# Patient Record
Sex: FEMALE | Race: BLACK OR AFRICAN AMERICAN | Employment: FULL TIME | URBAN - METROPOLITAN AREA
[De-identification: names, ages, dates, MRNs, and addresses within clinical notes are randomized per-mention and may not be internally consistent; named-entity substitution may affect disease eponyms.]

---

## 2017-01-11 ENCOUNTER — HOSPITAL ENCOUNTER (OUTPATIENT)
Dept: DIABETES SERVICES | Age: 67
Discharge: HOME | End: 2017-01-11
Payer: COMMERCIAL

## 2017-01-11 PROCEDURE — G0109 DIAB MANAGE TRN IND/GROUP: HCPCS

## 2018-03-23 LAB
LDL-C, EXTERNAL: 106.8
MICROALBUMIN UR TEST STR-MCNC: <0.7 MG/DL

## 2018-08-13 LAB
CREATININE, EXTERNAL: 0.9
HBA1C MFR BLD HPLC: 15.9 %

## 2018-09-20 ENCOUNTER — OFFICE VISIT (OUTPATIENT)
Dept: ENDOCRINOLOGY | Age: 68
End: 2018-09-20

## 2018-09-20 VITALS
DIASTOLIC BLOOD PRESSURE: 74 MMHG | OXYGEN SATURATION: 99 % | HEART RATE: 70 BPM | WEIGHT: 143.2 LBS | HEIGHT: 66 IN | SYSTOLIC BLOOD PRESSURE: 136 MMHG | BODY MASS INDEX: 23.01 KG/M2

## 2018-09-20 DIAGNOSIS — E11.39 TYPE 2 DIABETES MELLITUS WITH OTHER OPHTHALMIC COMPLICATION, WITH LONG-TERM CURRENT USE OF INSULIN (HCC): Primary | ICD-10-CM

## 2018-09-20 DIAGNOSIS — Z79.4 TYPE 2 DIABETES MELLITUS WITH OTHER OPHTHALMIC COMPLICATION, WITH LONG-TERM CURRENT USE OF INSULIN (HCC): Primary | ICD-10-CM

## 2018-09-20 RX ORDER — INSULIN GLARGINE 100 [IU]/ML
50 INJECTION, SOLUTION SUBCUTANEOUS DAILY
Qty: 15 ML | Refills: 11 | Status: SHIPPED | OUTPATIENT
Start: 2018-09-20 | End: 2019-05-01

## 2018-09-20 RX ORDER — SIMVASTATIN 40 MG/1
TABLET, FILM COATED ORAL
COMMUNITY
End: 2019-05-20

## 2018-09-20 RX ORDER — LEVOTHYROXINE SODIUM 100 UG/1
TABLET ORAL
COMMUNITY
End: 2018-09-20

## 2018-09-20 NOTE — PROGRESS NOTES
Chief Complaint   Patient presents with   [facility]     new patient    Diabetes         1. Have you been to the ER, urgent care clinic since your last visit? Hospitalized since your last visit? no    2. Have you seen or consulted any other health care providers outside of the [address] [place] [address] since your last visit? Include any pap smears or colon screening.   No    Patient is taking synjardy xr 25/1000 1 tab daily

## 2018-09-20 NOTE — PATIENT INSTRUCTIONS
Diabetes. Avoid added sugars. Watch carbohydrate intake with meals and aim to keep this less than 45 grams per meal.    A Mediterranean style diet with 55% or less of calories from carbohydrates has been shown to be very helpful for people with diabetes. This diet consists of vegetables, whole fruit, nuts, whole grains, beans, lentils, olive oil, fish, chicken, and less refined grains, red and processed meats. Exercise: work up to 30 minutes 5 days weekly of walking, or any other activity that gets your heart rate up. Make sure you are getting enough sleep - at least 7 hours/night. Medications:  Continue pioglitazone  Continue Synjardy XR    Recommend adding Lantus 40 units once daily - following trend from bedtime to fasting would help us adjust this  Recommend adding Trulicity 4.91 mg weekly to help with mealtime needs and decrease insulin needs    Goals for blood glucose:  Fasting  (less than 150)  Lunch, Dinner, Bedtime -  (less than 180). In the meantime with the Humalog 75/25 I recommend adjusting based on your sugar prior to a meal and how much starch you plan on eating with the meal  You may need only 20 units twice daily if your diet is good and glucose are lower.

## 2018-09-20 NOTE — PROGRESS NOTES
Chief Complaint   Patient presents with    hospitals Care     new patient    Diabetes     History of Present Illness: Benigno Price is a 74 y.o. female presents for evaluation of diabetes. she has had diabetes since 1999. Most recent A1c was 15 in 8/2018. She was referred by Dr. Sushma Maya. Diabetes related complications:  Does have some 'bleeding' in right eye. Needs cataract removed  She does not have any symptoms of neuropathy  Micrograms has been negative    Current diabetes regimen:  Humalog 75/25 - 40 units twice daily  Pioglitazone 15 mg daily  Synjdary XR 25/1000 daily    Glucoses:  Does not like taking fingersticks. She reports being prescribed the freestyle nicole. She hopes to go to the pharmacy and pick this up. 325 this AM.   She did have a glucose of 60 at work last week. Diet:  Last night - bag of chips and Coke Zero. This AM: toast, corn beef hash and scrambled eggs  Cup of apple sauce    Beverages - was having soda - 2 sodas (24 ounce each). Cut this out in the last week or so. BMI 23: She reports her weight at age 25 was around 125 pounds. Her highest weight was around 175-180 pounds. Vit D def - taking 5,000 units daily  Cholesterol: She is taking simvastatin 40 mg  Hypertension taking lisinopril and atenolol. Social:  Works at [employer]. does night shifts - been doing nights for about 5 months. 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  empagliflozin-metformin (SYNJARDY XR) 25-1,000 mg TBph Take  by mouth.  simvastatin (ZOCOR) 40 mg tablet Take  by mouth nightly.  pioglitazone (ACTOS) 15 mg tablet Take 15 mg by mouth daily.  aspirin delayed-release 81 mg tablet Take 81 mg by mouth daily.  atenolol (TENORMIN) 25 mg tablet Take 25 mg by mouth daily.  lisinopril (PRINIVIL, ZESTRIL) 20 mg tablet Take 20 mg by mouth daily.  insulin lispro protamine/insulin lispro (HUMALOG MIX 75-25) 100 unit/mL (75-25) injection 15 Units by SubCUTAneous route two (2) times a day. No current facility-administered medications for this visit. No Known Allergies  Family History   Problem Relation Age of Onset    Diabetes Mother     Heart Failure Mother     Cancer Father      lung cancer-2nd to smoking     Social History     Social History    Marital status: SINGLE     Spouse name: N/A    Number of children: N/A    Years of education: N/A     Occupational History    Not on file. Social History Main Topics    Smoking status: Never Smoker    Smokeless tobacco: Never Used    Alcohol use No    Drug use: No    Sexual activity: Not on file     Other Topics Concern    Not on file     Social History Narrative       Physical Examination:  Visit Vitals    /74    Pulse 70    Ht 5' 6\" (1.676 m)    Wt 143 lb 3.2 oz (65 kg)    SpO2 99%    BMI 23.11 kg/m2   -   - General: pleasant, no distress,   - HEENT:No scleral/conjunctival injection, EOMI,MMM  - Cardiovascular: regular, normal rate  - Respiratory: normal effort  - Integumentary: no edema  - Neurological: alert and oriented  - Psychiatric: normal mood and affect    Data Reviewed:   Kidney function normal  Microalbumin negative  Hemoglobin A1c 15    Assessment/Plan:   1.  Type 2 diabetes mellitus with other ophthalmic complication, with long-term current use of insulin (HCC)   Uncontrolled  Reviewed pathology of type II diabetes, including insulin resistance and progressive loss of beta cell function and insulin production with time. Explained the role of weight loss and limiting carbohydrate intake in affecting insulin needs. Reviewed basal and prandial insulin needs. Reviewed handout and gave basic directions on carbohydrate content of foods. Recommended limiting carbohydrate intake to < 45 g with meals. Reviewed how Humalog 75/25 provides insulin and how this somewhat approximates her insulin needs. Was clear to note that she could have hypoglycemia if she ate a small carbohydrate meal and glucoses were relatively well controlled. She has made dietary improvements recently. Encouraged her to continue efforts to avoid liquid calories and liquid sugars. Encouraged efforts to limit starch intake as well. Reviewed that these improvements will help decrease insulin needs  Explained how Josh Casanova works. Encouraged her to continue taking this  Continue pioglitazone  If she is willing to make substantial dietary improvements, I think a regimen of Lantus 40 units daily and Trulicity may work well for her. Reports having commercial insurance, so I hope this will be affordable for her. Reviewed how important glucose information is to help determine further adjustments to her insulin regimen. In the meantime, if she desires to finish up her Humalog 75/25, recommend she  glucose level, planned food intake, activity level, when determining how much of a dose to take. She may only need 20 units if glucose is reasonably well-controlled and her carbohydrate intake is modest.  Reviewed how Humalog 75/25 p.o. daily be taken roughly every 12 hours. Reviewed the importance of sleep with her. Encouraged her to transition from a nighttime schedule to a daytime work schedule. Having a normal sleep-wake cycle will be of significant benefit for her diabetes control as well as her long-term health. We will have her return in 2 weeks to reassess.     Patient Instructions Diabetes. Avoid added sugars. Watch carbohydrate intake with meals and aim to keep this less than 45 grams per meal.    A Mediterranean style diet with 55% or less of calories from carbohydrates has been shown to be very helpful for people with diabetes. This diet consists of vegetables, whole fruit, nuts, whole grains, beans, lentils, olive oil, fish, chicken, and less refined grains, red and processed meats. Exercise: work up to 30 minutes 5 days weekly of walking, or any other activity that gets your heart rate up. Make sure you are getting enough sleep - at least 7 hours/night. Medications:  Continue pioglitazone  Continue Synjardy XR    Recommend adding Lantus 40 units once daily - following trend from bedtime to fasting would help us adjust this  Recommend adding Trulicity 4.28 mg weekly to help with mealtime needs and decrease insulin needs    Goals for blood glucose:  Fasting  (less than 150)  Lunch, Dinner, Bedtime -  (less than 180). In the meantime with the Humalog 75/25 I recommend adjusting based on your sugar prior to a meal and how much starch you plan on eating with the meal  You may need only 20 units twice daily if your diet is good and glucose are lower. Follow-up Disposition:  Return in about 2 weeks (around 10/4/2018) for Tuesday 10/2/2018 at 4:10.

## 2018-09-20 NOTE — MR AVS SNAPSHOT
[address] [facility] [address] [place] [address] 
[phone] Patient: Melanie Dougherty MRN: [id number] :1950 Visit Information Date & Time Provider Department Dept. Phone Encounter #  
 2018  2:30 PM Cesar Lau, [address] [facility] Diabetes and Endocrinology-[facility] [phone] Follow-up Instructions Return in about 2 weeks (around 10/4/2018) for Tuesday 10/2/2018 at 4:10. Upcoming Health Maintenance Date Due Hepatitis C Screening 1950 DTaP/Tdap/Td series (1 - Tdap) 10/15/1973 FOBT Q 1 YEAR AGE 50-75 10/15/2002 ZOSTER VACCINE AGE 60> 8/15/2012 GLAUCOMA SCREENING Q2Y 10/15/2017 Bone Densitometry (Dexa) Screening 10/15/2017 Pneumococcal 65+ Low/Medium Risk (1 of 2 - PCV13) 10/15/2017 BREAST CANCER SCRN MAMMOGRAM 2018 Influenza Age 5 to Adult 2018 Allergies as of 2018  Review Complete On: 2018 By: Cesar Lau MD  
 No Known Allergies Current Immunizations  Never Reviewed Name Date Influenza Vaccine 10/1/2016 Not reviewed this visit You Were Diagnosed With   
  
 Codes Comments Type 2 diabetes mellitus with other ophthalmic complication, with long-term current use of insulin (HCC)    -  Primary ICD-10-CM: E11.39, Z79.4 ICD-9-CM: 250.50, V58.67 Vitals BP Pulse Height(growth percentile) Weight(growth percentile) SpO2 BMI  
 136/74 70 5' 6\" (1.676 m) 143 lb 3.2 oz (65 kg) 99% 23.11 kg/m2 OB Status Smoking Status Menopause Never Smoker Vitals History BMI and BSA Data Body Mass Index Body Surface Area  
 23.11 kg/m 2 1.74 m 2 Your Updated Medication List  
  
   
This list is accurate as of 18  4:15 PM.  Always use your most recent med list.  
  
  
  
  
 ACTOS 15 mg tablet Generic drug:  pioglitazone Take 15 mg by mouth daily. aspirin delayed-release 81 mg tablet Take 81 mg by mouth daily. atenolol 25 mg tablet Commonly known as:  TENORMIN Take 25 mg by mouth daily. dulaglutide 0.75 mg/0.5 mL sub-q pen Commonly known as:  TRULICITY  
0.5 mL by SubCUTAneous route every seven (7) days. insulin glargine 100 unit/mL (3 mL) Inpn Commonly known as:  LANTUS,BASAGLAR  
50 Units by SubCUTAneous route daily. lisinopril 20 mg tablet Commonly known as:  Jodie Needy Take 20 mg by mouth daily. simvastatin 40 mg tablet Commonly known as:  ZOCOR Take  by mouth nightly. SYNJARDY XR 25-1,000 mg Tbph  
Generic drug:  empagliflozin-metformin Take  by mouth. Prescriptions Printed Refills  
 insulin glargine (LANTUS,BASAGLAR) 100 unit/mL (3 mL) inpn 11 Si Units by SubCUTAneous route daily. Class: Print Route: SubCUTAneous  
 dulaglutide (TRULICITY) 0.41 JG/7.3 mL sub-q pen 11 Si.5 mL by SubCUTAneous route every seven (7) days. Class: Print Route: SubCUTAneous Follow-up Instructions Return in about 2 weeks (around 10/4/2018) for Tuesday 10/2/2018 at 4:10. Patient Instructions Diabetes. Avoid added sugars. Watch carbohydrate intake with meals and aim to keep this less than 45 grams per meal. 
 
A Mediterranean style diet with 55% or less of calories from carbohydrates has been shown to be very helpful for people with diabetes. This diet consists of vegetables, whole fruit, nuts, whole grains, beans, lentils, olive oil, fish, chicken, and less refined grains, red and processed meats. Exercise: work up to 30 minutes 5 days weekly of walking, or any other activity that gets your heart rate up. Make sure you are getting enough sleep - at least 7 hours/night. Medications: 
Continue pioglitazone Continue Synjardy XR Recommend adding Lantus 40 units once daily - following trend from bedtime to fasting would help us adjust this Recommend adding Trulicity 6.62 mg weekly to help with mealtime needs and decrease insulin needs Goals for blood glucose: 
Fasting  (less than 150) Lunch, Dinner, Bedtime -  (less than 180). In the meantime with the Humalog 75/25 I recommend adjusting based on your sugar prior to a meal and how much starch you plan on eating with the meal 
You may need only 20 units twice daily if your diet is good and glucose are lower. Introducing [facility]! [facility] introduces [facility] patient portal. Now you can access parts of your medical record, email your doctor's office, and request medication refills online. 1. In your internet browser, go to [email or web address] 2. Click on the First Time User? Click Here link in the Sign In box. You will see the New Member Sign Up page. 3. Enter your [facility] Access Code exactly as it appears below. You will not need to use this code after youve completed the sign-up process. If you do not sign up before the expiration date, you must request a new code. · [facility] Access Code: [email or web address] Expires: 12/19/2018  4:15 PM 
 
4. Enter the last four digits of your Social Security Number (xxxx) and Date of Birth (mm/dd/yyyy) as indicated and click Submit. You will be taken to the next sign-up page. 5. Create a [facility] ID. This will be your [facility] login ID and cannot be changed, so think of one that is secure and easy to remember. 6. Create a [facility] password. You can change your password at any time. 7. Enter your Password Reset Question and Answer. This can be used at a later time if you forget your password. 8. Enter your e-mail address. You will receive e-mail notification when new information is available in [address] 9. Click Sign Up. You can now view and download portions of your medical record. 10. Click the Download Summary menu link to download a portable copy of your medical information. If you have questions, please visit the Frequently Asked Questions section of the [employer] website. Remember, [employer] is NOT to be used for urgent needs. For medical emergencies, dial 911. Now available from your iPhone and Android! Please provide this summary of care documentation to your next provider. Your primary care clinician is listed as Rashmi Oneal. If you have any questions after today's visit, please call [phone].

## 2018-09-20 NOTE — LETTER
9/20/2018 10:09 PM 
 
Patient:  Clair Lorenzo YOB: 1950 Date of Visit: 9/20/2018 Dear Archana Mcfadden Thank you for referring Ms. Jennifer Malone to me for evaluation/treatment. Below are the relevant portions of my assessment and plan of care. If you have questions, please do not hesitate to call me. I look forward to following MsMax Yifan Tsai along with you.  
 
 
 
Sincerely, 
 
 
Jones Hood MD

## 2018-10-02 ENCOUNTER — OFFICE VISIT (OUTPATIENT)
Dept: ENDOCRINOLOGY | Age: 68
End: 2018-10-02

## 2018-10-02 VITALS
SYSTOLIC BLOOD PRESSURE: 103 MMHG | HEART RATE: 79 BPM | BODY MASS INDEX: 22.11 KG/M2 | WEIGHT: 137.6 LBS | RESPIRATION RATE: 12 BRPM | DIASTOLIC BLOOD PRESSURE: 65 MMHG | OXYGEN SATURATION: 100 % | TEMPERATURE: 98.2 F | HEIGHT: 66 IN

## 2018-10-02 DIAGNOSIS — E78.5 DYSLIPIDEMIA: ICD-10-CM

## 2018-10-02 DIAGNOSIS — Z79.4 TYPE 2 DIABETES MELLITUS WITH OTHER OPHTHALMIC COMPLICATION, WITH LONG-TERM CURRENT USE OF INSULIN (HCC): Primary | ICD-10-CM

## 2018-10-02 DIAGNOSIS — E11.39 TYPE 2 DIABETES MELLITUS WITH OTHER OPHTHALMIC COMPLICATION, WITH LONG-TERM CURRENT USE OF INSULIN (HCC): Primary | ICD-10-CM

## 2018-10-02 DIAGNOSIS — I10 HTN (HYPERTENSION), BENIGN: ICD-10-CM

## 2018-10-02 RX ORDER — CHOLECALCIFEROL (VITAMIN D3) 125 MCG
5000 CAPSULE ORAL
COMMUNITY

## 2018-10-02 NOTE — PROGRESS NOTES
Seamus Dejesus is a 74 y.o. female      Chief Complaint   Patient presents with    Follow-up    Diabetes     Last A1c 15.9% (10/01/18)     1. Have you been to the ER, urgent care clinic since your last visit? Hospitalized since your last visit? No    2. Have you seen or consulted any other health care providers outside of the [address] [place] [address] since your last visit? Include any pap smears or colon screening.  No

## 2018-10-02 NOTE — PROGRESS NOTES
History of Present Illness: Keanu Knapp is a 74 y.o. female presents f2or follow-up of diabetes. she has had diabetes since 1999. Most recent A1c was 15 in 8/2018. She was referred by Dr. Juanjose Roa. Diabetes related complications:  Does have some 'bleeding' in right eye. Needs cataract removed    Current diabetes regimen:  Humalog 75/25 - 40 units twice daily  Pioglitazone 15 mg daily  Synjdary XR 25/1000 daily  After today's visit she is hopeful she can switch over to Lantus and Trulicity, which will replace the Humalog 75/25    Glucoses:  Brings log. Values range from 60s to 300s. Values typically ranged from 90s-low 200s  She largely works nighttime shifts, so her morning value is after working. These values tend to be the lowest, but low values can also occur prior to lunch or with her evening    She does report an episode where she passed out at work due to hypoglycemia    Diet:  Trying to eat more salads, baked chicken  Salad or sandwich and fruit. Beverages: drinking water. Generally cut out soda. BMI 22: She reports her weight at age 25 was around 125 pounds. Vit D def - taking 5,000 units daily    Cholesterol:  simvastatin 40 mg    Hypertension taking lisinopril and atenolol. BP lower. She is currently taking 25 mg atenolol daily. She denies significant orthostatic symptoms. Social:  Works at [employer] [address]. does night shifts - been doing nights for about 5 months. 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  empagliflozin-metformin (SYNJARDY XR) 25-1,000 mg TBph Take  by mouth.  simvastatin (ZOCOR) 40 mg tablet Take  by mouth nightly.  pioglitazone (ACTOS) 15 mg tablet Take 15 mg by mouth daily.  aspirin delayed-release 81 mg tablet Take 81 mg by mouth daily.  atenolol (TENORMIN) 25 mg tablet Take 25 mg by mouth daily.  lisinopril (PRINIVIL, ZESTRIL) 20 mg tablet Take 20 mg by mouth daily.  insulin glargine (LANTUS,BASAGLAR) 100 unit/mL (3 mL) inpn 50 Units by SubCUTAneous route daily.  dulaglutide (TRULICITY) 3.94 HU/2.8 mL sub-q pen 0.5 mL by SubCUTAneous route every seven (7) days. No current facility-administered medications for this visit. No Known Allergies    Review of Systems:  - Eyes: Vision is stable  - Cardiovascular: no chest pain  - Respiratory: no shortness of breath  - Musculoskeletal: no myalgias  - Neurological: no numbness/tingling in extremities    Physical Examination:  Visit Vitals    /65 (BP 1 Location: Left arm, BP Patient Position: Sitting)    Pulse 79    Temp 98.2 °F (36.8 °C) (Oral)    Resp 12    Ht 5' 6\" (1.676 m)    Wt 137 lb 9.6 oz (62.4 kg)    SpO2 100%    BMI 22.21 kg/m2   -   - General: pleasant, no distress, normal gait   HEENT: hearing intact, EOMI, clear sclera without icterus  - Cardiovascular: regular, normal rate   - Respiratory: normal effort  - Integumentary: no edema  - Psychiatric: normal mood and affect    Data Reviewed:   Kidney function normal  Microalbumin negative  Hemoglobin A1c 15.9 8/2018  , , triglycerides 66    Assessment/Plan:   1. Type 2 diabetes mellitus with other ophthalmic complication, with long-term current use of insulin (HCC)   Not controlled. Overall, her glucoses are improved. She is planning on changing her diabetes regimen from Humalog 75/25 to the recommended regimen of Trulicity and Lantus. We will see how this works for her. Encouraged her to update me within a few weeks if this change.   I think the risk of hypoglycemia will be substantially less with the new regimen. Continue pioglitazone and Synjardy   2. HTN (hypertension), benign   Blood pressure lower than needed. Recommended she take 1/2 tablet of atenolol for 1 week, then discontinue. Recommend she take lisinopril prior to going to bed   3. Dyslipidemia   Continue simvastatin     Patient Instructions   Diabetes. Avoid added sugars. Continue efforts here  Increase exercise     Medications:  Continue pioglitazone  Continue Synjardy XR    Recommend adding Lantus 40 units once daily - following trend from bedtime to fasting would help us adjust this  Recommend adding Trulicity 0.40 mg weekly to help with mealtime needs and decrease insulin needs    For now, decrease Humalog 75/25 to 35 units    Goals for blood glucose:  Fasting  (less than 150)  Lunch, Dinner, Bedtime -  (less than 180). Follow-up Disposition:  Return in about 3 months (around 1/2/2019).     Copy sent to:

## 2018-10-02 NOTE — MR AVS SNAPSHOT
[address] [phone] Patient: Cristofer Moeller MRN: [id number] :1950 Visit Information Date & Time Provider Department Dept. Phone Encounter #  
 10/2/2018  4:10 PM Gm Dey, [address] [place] [address] [phone] Follow-up Instructions Return in about 3 months (around 2019). Upcoming Health Maintenance Date Due Hepatitis C Screening 1950 DTaP/Tdap/Td series (1 - Tdap) 10/15/1973 Shingrix Vaccine Age 50> (1 of 2) 10/15/2002 FOBT Q 1 YEAR AGE 50-75 10/15/2002 Bone Densitometry (Dexa) Screening 10/15/2017 Pneumococcal 65+ Low/Medium Risk (1 of 2 - PCV13) 10/15/2017 BREAST CANCER SCRN MAMMOGRAM 2018 Influenza Age 5 to Adult 2018 GLAUCOMA SCREENING Q2Y 2020 Allergies as of 10/2/2018  Review Complete On: 10/2/2018 By: Gm Dey MD  
 No Known Allergies Current Immunizations  Never Reviewed Name Date Influenza Vaccine 10/1/2016 Not reviewed this visit Vitals BP Pulse Temp Resp Height(growth percentile) Weight(growth percentile) 103/65 (BP 1 Location: Left arm, BP Patient Position: Sitting) 79 98.2 °F (36.8 °C) (Oral) 12 5' 6\" (1.676 m) 137 lb 9.6 oz (62.4 kg) SpO2 BMI OB Status Smoking Status 100% 22.21 kg/m2 Menopause Never Smoker Vitals History BMI and BSA Data Body Mass Index Body Surface Area  
 22.21 kg/m 2 1.7 m 2 Preferred Pharmacy Pharmacy Name Phone [facility] [phone] Your Updated Medication List  
  
   
This list is accurate as of 10/2/18  4:48 PM.  Always use your most recent med list.  
  
  
  
  
 ACTOS 15 mg tablet Generic drug:  pioglitazone Take 15 mg by mouth daily. aspirin delayed-release 81 mg tablet Take 81 mg by mouth daily. atenolol 25 mg tablet Commonly known as:  TENORMIN Take 25 mg by mouth daily. cholecalciferol 5,000 unit capsule Commonly known as:  VITAMIN D3 Take 5,000 Units by mouth daily. dulaglutide 0.75 mg/0.5 mL sub-q pen Commonly known as:  TRULICITY  
0.5 mL by SubCUTAneous route every seven (7) days. insulin glargine 100 unit/mL (3 mL) Inpn Commonly known as:  LANTUS,BASAGLAR  
50 Units by SubCUTAneous route daily. lisinopril 20 mg tablet Commonly known as:  Milon Kitchen Take 20 mg by mouth daily. simvastatin 40 mg tablet Commonly known as:  ZOCOR Take  by mouth nightly. SYN[facility] XR [facility]1,[facility]  
Generic drug:  empagliflozin-metformin Take  by mouth. Follow-up Instructions Return in about 3 months (around 1/2/2019). Patient Instructions Diabetes. Avoid added sugars. Continue efforts here Increase exercise Medications: 
Continue pioglitazone Continue Synjardy XR Recommend adding Lantus 40 units once daily - following trend from bedtime to fasting would help us adjust this Recommend adding Trulicity 9.36 mg weekly to help with mealtime needs and decrease insulin needs For now, decrease Humalog 75/25 to 35 units Goals for blood glucose: 
Fasting  (less than 150) Lunch, Dinner, Bedtime -  (less than 180). Introducing [facility]! [employer] introduces TopDeejays patient portal. Now you can access parts of your medical record, email your doctor's office, and request medication refills online. 1. In your internet browser, go to [email or web address] 2. Click on the First Time User? Click Here link in the Sign In box. You will see the New Member Sign Up page. 3. Enter your TopDeejays Access Code exactly as it appears below. You will not need to use this code after youve completed the sign-up process.  If you do not sign up before the expiration date, you must request a new code. · rSmart Access Code: [email or web address] Expires: 12/19/2018  4:15 PM 
 
4. Enter the last four digits of your Social Security Number (xxxx) and Date of Birth (mm/dd/yyyy) as indicated and click Submit. You will be taken to the next sign-up page. 5. Create a rSmart ID. This will be your rSmart login ID and cannot be changed, so think of one that is secure and easy to remember. 6. Create a rSmart password. You can change your password at any time. 7. Enter your Password Reset Question and Answer. This can be used at a later time if you forget your password. 8. Enter your e-mail address. You will receive e-mail notification when new information is available in [address] 9. Click Sign Up. You can now view and download portions of your medical record. 10. Click the Download Summary menu link to download a portable copy of your medical information. If you have questions, please visit the Frequently Asked Questions section of the rSmart website. Remember, rSmart is NOT to be used for urgent needs. For medical emergencies, dial 911. Now available from your iPhone and Android! Please provide this summary of care documentation to your next provider. Your primary care clinician is listed as Meek Ross. If you have any questions after today's visit, please call [phone].

## 2018-10-02 NOTE — PATIENT INSTRUCTIONS
Diabetes. Avoid added sugars. Continue efforts here  Increase exercise     Medications:  Continue pioglitazone  Continue Synjardy XR    Recommend adding Lantus 40 units once daily - following trend from bedtime to fasting would help us adjust this  Recommend adding Trulicity 6.52 mg weekly to help with mealtime needs and decrease insulin needs    For now, decrease Humalog 75/25 to 35 units    Goals for blood glucose:  Fasting  (less than 150)  Lunch, Dinner, Bedtime -  (less than 180).

## 2019-02-11 ENCOUNTER — HOSPITAL ENCOUNTER (OUTPATIENT)
Dept: MAMMOGRAPHY | Age: 69
Discharge: HOME | End: 2019-02-11
Attending: FAMILY MEDICINE
Payer: COMMERCIAL

## 2019-02-11 DIAGNOSIS — Z12.39 SCREENING BREAST EXAMINATION: ICD-10-CM

## 2019-02-11 PROCEDURE — 77063 BREAST TOMOSYNTHESIS BI: CPT

## 2019-02-21 ENCOUNTER — OFFICE VISIT (OUTPATIENT)
Dept: ENDOCRINOLOGY | Age: 69
End: 2019-02-21

## 2019-02-21 VITALS
BODY MASS INDEX: 22.4 KG/M2 | HEIGHT: 66 IN | WEIGHT: 139.4 LBS | HEART RATE: 90 BPM | DIASTOLIC BLOOD PRESSURE: 70 MMHG | SYSTOLIC BLOOD PRESSURE: 120 MMHG

## 2019-02-21 DIAGNOSIS — E78.5 DYSLIPIDEMIA: ICD-10-CM

## 2019-02-21 DIAGNOSIS — E11.39 TYPE II OR UNSPECIFIED TYPE DIABETES MELLITUS WITH OPHTHALMIC MANIFESTATIONS, NOT STATED AS UNCONTROLLED(250.50) (HCC): Primary | ICD-10-CM

## 2019-02-21 DIAGNOSIS — I10 HTN (HYPERTENSION), BENIGN: ICD-10-CM

## 2019-02-21 LAB — HBA1C MFR BLD HPLC: 14.4 %

## 2019-02-21 RX ORDER — INSULIN LISPRO 100 [IU]/ML
INJECTION, SOLUTION INTRAVENOUS; SUBCUTANEOUS
COMMUNITY
End: 2019-05-20

## 2019-02-21 NOTE — PATIENT INSTRUCTIONS
Diabetes. Medications:  Continue pioglitazone    Add Trulicity 9.27 mg weekly    Decrease Lantus to 50 units once daily - follow glucose trend when you are not eating to assess. Goals for blood glucose:  Fasting  (less than 150)  Lunch, Dinner, Bedtime -  (less than 180).

## 2019-02-21 NOTE — PROGRESS NOTES
History of Present Illness: Arabella Berman is a 79 y.o. female presents for follow-up of diabetes. she has had diabetes since 1999. Most recent A1c was 15.3 prior to today's visit but she reports this is somewhat improved from 16-month or so ago. Her primary care is Dr. Kourtney Young. Diabetes related complications:  Does have some 'bleeding' in right eye. Needs cataract removed    She was recently hospitalized and was discharged on Lantus 70 units once daily. Current diabetes regimen:  Lantus 70 units once daily. Pioglitazone 15 mg daily    She has previously not tolerated metformin due to gastrointestinal problems as noted in HPI, she has not tolerated metformin in the past due to gastrointestinal problems. .  Glucoses:  As low as 80s at end of the shift  Night - 190s after eating and then sleeping. May feel low at night and may have some crackers. Diet:  She met with the diabetes educators At  [facility] while she was in the hospital.  She is working on following a good diet. Vit D def - taking 5,000 units daily    Cholesterol:  simvastatin 40 mg    Hypertension taking lisinopril and atenolol. Social:  Works at [employer]  13. does night shifts - been doing nights for about 5 months. She is planning to change to daytime shift. 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  simvastatin (ZOCOR) 40 mg tablet Take  by mouth nightly.  pioglitazone (ACTOS) 15 mg tablet Take 15 mg by mouth daily.  atenolol (TENORMIN) 25 mg tablet Take 25 mg by mouth daily.  lisinopril (PRINIVIL, ZESTRIL) 20 mg tablet Take 20 mg by mouth daily.  empagliflozin-metformin (SYNJARDY XR) 25-1,000 mg TBph Take  by mouth.  insulin glargine (LANTUS,BASAGLAR) 100 unit/mL (3 mL) inpn 50 Units by SubCUTAneous route daily.  dulaglutide (TRULICITY) 3.62 JT/7.8 mL sub-q pen 0.5 mL by SubCUTAneous route every seven (7) days.  aspirin delayed-release 81 mg tablet Take 81 mg by mouth daily. No current facility-administered medications for this visit. No Known Allergies    Review of Systems:  - Eyes: no blurry vision or double vision  - Cardiovascular: no chest pain  - Respiratory: no shortness of breath  - Musculoskeletal: no myalgias  - Neurological: no numbness/tingling in extremities    Physical Examination:  Visit Vitals  /70 (BP 1 Location: Right arm, BP Patient Position: Sitting)   Pulse 90   Ht 5' 6\" (1.676 m)   Wt 139 lb 6.4 oz (63.2 kg)   BMI 22.50 kg/m²   -   - General: pleasant, no distress, normal gait   HEENT: hearing intact, EOMI, clear sclera without icterus  - Cardiovascular: regular, normal rate   - Respiratory: normal effort  - Integumentary: no edema   Diabetic foot exam:           Right Foot:   Visual Exam: normal . + thickened toenails   Pulse DP: 1+ (weak)   Filament test: normal sensation        - Psychiatric: normal mood and affect    Data Reviewed:     Component      Latest Ref Rng & Units 2/21/2019           4:31 PM   Hemoglobin A1c (POC)      % 14.4     Lab Results   Component Value Date/Time    Hemoglobin A1c, External 15.9 08/13/2018      Lab Results   Component Value Date/Time    Creatinine, External 0.9 08/13/2018        Lab Results   Component Value Date/Time    LDL-C, External 106.8 03/23/2018      No results found for: TSH, FT4, TRALT, TSHEXT     Assessment/Plan:   1.  Type II or unspecified type diabetes mellitus with ophthalmic manifestations, not stated as uncontrolled(250.50) ([facility] [address]   Uncontrolled based on hemoglobin A1c, but her reported home glucose monitoring suggest substantial improvement  Continue Lantus, but decreased to 50 units daily  Continue pioglitazone  Add Trulicity 0.5 mg daily. It appears to be covered for her. A voucher was also provided for a months worth of medication for free. We discussed the cost of medications. A suggestion was made that she could consider using generic NPH from [facility] taking 2 injections a day. As noted in the HPI, she has not previously tolerated metformin due to gastrointestinal problems   2. HTN (hypertension), benign   Well-controlled. Continue lisinopril and atenolol. We will reassess at follow-up   3. Dyslipidemia   continue simvastatin       Patient Instructions   Diabetes. Medications:  Continue pioglitazone    Add Trulicity 0.93 mg weekly    Decrease Lantus to 50 units once daily - follow glucose trend when you are not eating to assess. Goals for blood glucose:  Fasting  (less than 150)  Lunch, Dinner, Bedtime -  (less than 180). Follow-up Disposition:  Return in about 3 months (around 5/21/2019) for Diabetes Follow-up.       Copy sent to:

## 2019-02-27 ENCOUNTER — DOCUMENTATION ONLY (OUTPATIENT)
Dept: ENDOCRINOLOGY | Age: 69
End: 2019-02-27

## 2019-02-27 DIAGNOSIS — E11.39 TYPE II OR UNSPECIFIED TYPE DIABETES MELLITUS WITH OPHTHALMIC MANIFESTATIONS, NOT STATED AS UNCONTROLLED(250.50) (HCC): Primary | ICD-10-CM

## 2019-05-01 ENCOUNTER — HOSPITAL ENCOUNTER (OUTPATIENT)
Dept: PREADMISSION TESTING | Age: 69
Discharge: HOME | End: 2019-05-01
Payer: COMMERCIAL

## 2019-05-01 VITALS
RESPIRATION RATE: 18 BRPM | WEIGHT: 137.25 LBS | DIASTOLIC BLOOD PRESSURE: 70 MMHG | SYSTOLIC BLOOD PRESSURE: 148 MMHG | HEART RATE: 93 BPM | HEIGHT: 68 IN | TEMPERATURE: 98.1 F | BODY MASS INDEX: 20.8 KG/M2

## 2019-05-01 LAB
ATRIAL RATE: 72 BPM
BASOPHILS # BLD: 0.1 K/UL (ref 0–0.1)
BASOPHILS NFR BLD: 1 % (ref 0–1)
CALCULATED P AXIS, ECG09: 75 DEGREES
CALCULATED R AXIS, ECG10: 85 DEGREES
CALCULATED T AXIS, ECG11: 56 DEGREES
DIAGNOSIS, 93000: NORMAL
DIFFERENTIAL METHOD BLD: ABNORMAL
EOSINOPHIL # BLD: 0 K/UL (ref 0–0.4)
EOSINOPHIL NFR BLD: 1 % (ref 0–7)
ERYTHROCYTE [DISTWIDTH] IN BLOOD BY AUTOMATED COUNT: 12.3 % (ref 11.5–14.5)
EST. AVERAGE GLUCOSE BLD GHB EST-MCNC: 367 MG/DL
HBA1C MFR BLD: 14.4 % (ref 4.2–6.3)
HCT VFR BLD AUTO: 42 % (ref 35–47)
HGB BLD-MCNC: 13.4 G/DL (ref 11.5–16)
IMM GRANULOCYTES # BLD AUTO: 0 K/UL (ref 0–0.04)
IMM GRANULOCYTES NFR BLD AUTO: 0 % (ref 0–0.5)
LYMPHOCYTES # BLD: 2 K/UL (ref 0.8–3.5)
LYMPHOCYTES NFR BLD: 48 % (ref 12–49)
MCH RBC QN AUTO: 27.9 PG (ref 26–34)
MCHC RBC AUTO-ENTMCNC: 31.9 G/DL (ref 30–36.5)
MCV RBC AUTO: 87.5 FL (ref 80–99)
MONOCYTES # BLD: 0.4 K/UL (ref 0–1)
MONOCYTES NFR BLD: 9 % (ref 5–13)
NEUTS SEG # BLD: 1.8 K/UL (ref 1.8–8)
NEUTS SEG NFR BLD: 41 % (ref 32–75)
NRBC # BLD: 0 K/UL (ref 0–0.01)
NRBC BLD-RTO: 0 PER 100 WBC
P-R INTERVAL, ECG05: 138 MS
PLATELET # BLD AUTO: 187 K/UL (ref 150–400)
PMV BLD AUTO: 13 FL (ref 8.9–12.9)
Q-T INTERVAL, ECG07: 392 MS
QRS DURATION, ECG06: 78 MS
QTC CALCULATION (BEZET), ECG08: 429 MS
RBC # BLD AUTO: 4.8 M/UL (ref 3.8–5.2)
VENTRICULAR RATE, ECG03: 72 BPM
WBC # BLD AUTO: 4.2 K/UL (ref 3.6–11)

## 2019-05-01 PROCEDURE — 93005 ELECTROCARDIOGRAM TRACING: CPT

## 2019-05-01 PROCEDURE — 83036 HEMOGLOBIN GLYCOSYLATED A1C: CPT

## 2019-05-01 PROCEDURE — 85025 COMPLETE CBC W/AUTO DIFF WBC: CPT

## 2019-05-01 RX ORDER — INSULIN GLARGINE 100 [IU]/ML
50 INJECTION, SOLUTION SUBCUTANEOUS
COMMUNITY
End: 2022-09-29 | Stop reason: ALTCHOICE

## 2019-05-20 ENCOUNTER — OFFICE VISIT (OUTPATIENT)
Dept: ENDOCRINOLOGY | Age: 69
End: 2019-05-20

## 2019-05-20 VITALS
OXYGEN SATURATION: 99 % | DIASTOLIC BLOOD PRESSURE: 70 MMHG | RESPIRATION RATE: 16 BRPM | HEART RATE: 74 BPM | WEIGHT: 137.8 LBS | BODY MASS INDEX: 20.88 KG/M2 | SYSTOLIC BLOOD PRESSURE: 120 MMHG | HEIGHT: 68 IN

## 2019-05-20 DIAGNOSIS — E78.5 DYSLIPIDEMIA: ICD-10-CM

## 2019-05-20 DIAGNOSIS — I10 HTN (HYPERTENSION), BENIGN: ICD-10-CM

## 2019-05-20 DIAGNOSIS — E11.39 TYPE II OR UNSPECIFIED TYPE DIABETES MELLITUS WITH OPHTHALMIC MANIFESTATIONS, NOT STATED AS UNCONTROLLED(250.50) (HCC): Primary | ICD-10-CM

## 2019-05-20 RX ORDER — SIMVASTATIN 80 MG/1
80 TABLET, FILM COATED ORAL
COMMUNITY
End: 2022-09-29 | Stop reason: ALTCHOICE

## 2019-05-20 NOTE — PROGRESS NOTES
History of Present Illness: Helen Espinal is a 79 y.o. female presents for follow-up of diabetes. he has had diabetes since 1999. Most recent A1c was 15.3 prior to today's visit but she reports this is somewhat improved from 16-month or so ago. Her primary care is Dr. Geraldo Ortega. Needs     Diabetes related complications:  Does have some 'bleeding' in right eye. Needs cataract removed    She was recently hospitalized and was discharged on Lantus 70 units once daily. Current diabetes regimen:  Lantus 50 units once daily. Pioglitazone 15 mg daily  Trulicity - to resume on Wednesday -- 0.75 mg weekly. Has not been taking. In past she has not tolerated metformin in due to gastrointestinal problems. .    Glucoses:  465 now. Feels tired. Diet:  She met with the diabetes educators At  [facility] [place] while she was in the hospital.  She is working on following a good diet. Had some pasta over the weekend    Vit D def - taking 5,000 units daily    Cholesterol:  simvastatin 40 mg    Hypertension taking lisinopril and atenolol. Social:  Works at [employer]  13. does night shifts - been doing nights for about 5 months. She is planning to change to daytime shift. 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  insulin glargine (LANTUS U-100 INSULIN) 100 unit/mL injection 50 Units by SubCUTAneous route nightly.  insulin lispro (HUMALOG U-100 INSULIN) 100 unit/mL injection Humalog U-100 Insulin 100 unit/mL subcutaneous solution    cholecalciferol (VITAMIN D3) 5,000 unit capsule Take 5,000 Units by mouth every morning.  pioglitazone (ACTOS) 15 mg tablet Take 15 mg by mouth every morning.     aspirin delayed-release 81 mg tablet Take 81 mg by mouth every morning.  atenolol (TENORMIN) 25 mg tablet Take 25 mg by mouth every morning.  lisinopril (PRINIVIL, ZESTRIL) 20 mg tablet Take 20 mg by mouth daily.  dulaglutide (TRULICITY) 1.20 TZ/8.4 mL sub-q pen 0.5 mL by SubCUTAneous route every seven (7) days.  simvastatin (ZOCOR) 40 mg tablet Take  by mouth nightly. No current facility-administered medications for this visit. No Known Allergies    Review of Systems:  - Eyes: no blurry vision or double vision  - Cardiovascular: no chest pain  - Respiratory: no shortness of breath  - Musculoskeletal: no myalgias  - Neurological: no numbness/tingling in extremities    Physical Examination:  Visit Vitals  /70   Pulse 74   Resp 16   Ht 5' 7.5\" (1.715 m)   Wt 137 lb 12.8 oz (62.5 kg)   SpO2 99%   BMI 21.26 kg/m²   -   - General: pleasant, no distress, normal gait   HEENT: hearing intact, EOMI, clear sclera without icterus  - Cardiovascular: regular, normal rate   - Respiratory: normal effort  - Integumentary: no edema  - Psychiatric: normal mood and affect    Data Reviewed:   Lab Results   Component Value Date/Time    Hemoglobin A1c 14.4 05/01/2019 01:22 PM    Hemoglobin A1c, External 15.9 08/13/2018      Lab Results   Component Value Date/Time    Creatinine, External 0.9 08/13/2018        Lab Results   Component Value Date/Time    LDL-C, External 106.8 03/23/2018      No results found for: TSH, FT4, TRALT, TMCLT, TSHEXT     Assessment/Plan:   1. Type II or unspecified type diabetes mellitus with ophthalmic manifestations, not stated as uncontrolled(250.50) ([address]   Not controlled  Optimistic that resuming Trulicity will help   advised her to follow glucose trend from bedtime to fasting to assess Lantus dose. Continue pioglitazone. May need more medication. Fiasp sample given for management of highs. 3:50 for values > 150   2. HTN (hypertension), benign   - controlled.     3. Dyslipidemia - continue simvastatin. Repeat labs prior to follow-up. Patient Instructions   Diabetes. Medications:  Continue pioglitazone    Take Trulicity 3.87 mg weekly    Continue  Lantus to 50 units once daily - follow glucose trend when you are not eating/overnight to assess. Increase dose if glucoses are rising overnight. Humalog/Fiasp for high glucoses: Take no more frequently than every 4 hours. 151- 200: 3 units  201-250: 6 units  251-300: 9 units  301-350: 12 units  351-400: 15 units  401 +     : 18 units      Goals for blood glucose:  Fasting  (less than 150)  Lunch, Dinner, Bedtime -  (less than 180). Follow-up and Dispositions    · Return in about 3 months (around 8/20/2019).          Copy sent to:

## 2019-05-20 NOTE — PATIENT INSTRUCTIONS
Diabetes. Medications:  Continue pioglitazone    Take Trulicity 9.04 mg weekly    Continue  Lantus to 50 units once daily - follow glucose trend when you are not eating/overnight to assess. Increase dose if glucoses are rising overnight. Humalog/Fiasp for high glucoses: Take no more frequently than every 4 hours. 151- 200: 3 units  201-250: 6 units  251-300: 9 units  301-350: 12 units  351-400: 15 units  401 +     : 18 units      Goals for blood glucose:  Fasting  (less than 150)  Lunch, Dinner, Bedtime -  (less than 180).

## 2019-05-20 NOTE — PROGRESS NOTES
Lab Results   Component Value Date/Time    Hemoglobin A1c 14.4 (H) 05/01/2019 01:22 PM    Hemoglobin A1c (POC) 14.4 02/21/2019 04:31 PM    Hemoglobin A1c, External 15.9 08/13/2018     Lab Results   Component Value Date/Time    Cholesterol, total 254 (H) 12/06/2016 03:47 AM    HDL Cholesterol 93 12/06/2016 03:47 AM    LDL, calculated 146.8 (H) 12/06/2016 03:47 AM    VLDL, calculated 14.2 12/06/2016 03:47 AM    Triglyceride 71 12/06/2016 03:47 AM    CHOL/HDL Ratio 2.7 12/06/2016 03:47 AM     Diabetic Foot and Eye Exam HM Status   Topic Date Due    Diabetic Foot Care  02/21/2020    Eye Exam  08/21/2020

## 2019-08-20 ENCOUNTER — TELEPHONE (OUTPATIENT)
Dept: ENDOCRINOLOGY | Age: 69
End: 2019-08-20

## 2019-08-20 NOTE — TELEPHONE ENCOUNTER
Called and spoke with pt to remind her of her upcoming appt and to be sure to complete her lab work prior to her appt.

## 2021-07-06 ENCOUNTER — TRANSCRIBE ORDER (OUTPATIENT)
Dept: SCHEDULING | Age: 71
End: 2021-07-06

## 2021-07-06 DIAGNOSIS — Z12.31 ENCOUNTER FOR MAMMOGRAM TO ESTABLISH BASELINE MAMMOGRAM: Primary | ICD-10-CM

## 2021-09-02 ENCOUNTER — HOSPITAL ENCOUNTER (OUTPATIENT)
Dept: MAMMOGRAPHY | Age: 71
Discharge: HOME | End: 2021-09-02
Attending: FAMILY MEDICINE

## 2021-09-02 DIAGNOSIS — Z12.31 ENCOUNTER FOR MAMMOGRAM TO ESTABLISH BASELINE MAMMOGRAM: ICD-10-CM

## 2021-09-02 PROCEDURE — 77067 SCR MAMMO BI INCL CAD: CPT

## 2021-10-22 ENCOUNTER — OFFICE VISIT (OUTPATIENT)
Dept: ENDOCRINOLOGY | Age: 71
End: 2021-10-22

## 2021-10-22 VITALS
HEIGHT: 67 IN | OXYGEN SATURATION: 100 % | WEIGHT: 164 LBS | DIASTOLIC BLOOD PRESSURE: 82 MMHG | BODY MASS INDEX: 25.74 KG/M2 | HEART RATE: 97 BPM | SYSTOLIC BLOOD PRESSURE: 182 MMHG | RESPIRATION RATE: 16 BRPM

## 2021-10-22 DIAGNOSIS — E78.2 MIXED HYPERLIPIDEMIA: ICD-10-CM

## 2021-10-22 DIAGNOSIS — Z79.4 TYPE 2 DIABETES MELLITUS WITH HYPERGLYCEMIA, WITH LONG-TERM CURRENT USE OF INSULIN (HCC): Primary | ICD-10-CM

## 2021-10-22 DIAGNOSIS — E11.29 DIABETES MELLITUS WITH MICROALBUMINURIA (HCC): ICD-10-CM

## 2021-10-22 DIAGNOSIS — E11.319 DIABETIC RETINOPATHY ASSOCIATED WITH TYPE 2 DIABETES MELLITUS, MACULAR EDEMA PRESENCE UNSPECIFIED, UNSPECIFIED LATERALITY, UNSPECIFIED RETINOPATHY SEVERITY (HCC): ICD-10-CM

## 2021-10-22 DIAGNOSIS — R80.9 DIABETES MELLITUS WITH MICROALBUMINURIA (HCC): ICD-10-CM

## 2021-10-22 DIAGNOSIS — R20.8 DECREASED SENSATION OF FOOT: ICD-10-CM

## 2021-10-22 DIAGNOSIS — I10 PRIMARY HYPERTENSION: ICD-10-CM

## 2021-10-22 DIAGNOSIS — E11.65 TYPE 2 DIABETES MELLITUS WITH HYPERGLYCEMIA, WITH LONG-TERM CURRENT USE OF INSULIN (HCC): Primary | ICD-10-CM

## 2021-10-22 PROCEDURE — 99214 OFFICE O/P EST MOD 30 MIN: CPT | Performed by: INTERNAL MEDICINE

## 2021-10-22 RX ORDER — FLASH GLUCOSE SENSOR
KIT MISCELLANEOUS
Qty: 1 KIT | Refills: 0 | Status: SHIPPED | COMMUNITY
Start: 2021-10-22

## 2021-10-22 RX ORDER — INSULIN DEGLUDEC INJECTION 200 U/ML
60 INJECTION, SOLUTION SUBCUTANEOUS DAILY
COMMUNITY
End: 2022-09-29 | Stop reason: ALTCHOICE

## 2021-10-22 RX ORDER — SIMVASTATIN 40 MG/1
TABLET, FILM COATED ORAL
COMMUNITY
End: 2022-09-29 | Stop reason: ALTCHOICE

## 2021-10-22 RX ORDER — DULAGLUTIDE 0.75 MG/.5ML
0.75 INJECTION, SOLUTION SUBCUTANEOUS
Qty: 1 EACH | Refills: 0 | Status: SHIPPED | COMMUNITY
Start: 2021-10-22 | End: 2022-09-29 | Stop reason: DRUGHIGH

## 2021-10-22 RX ORDER — FLASH GLUCOSE SENSOR
KIT MISCELLANEOUS
Qty: 1 KIT | Refills: 5 | Status: SHIPPED | OUTPATIENT
Start: 2021-10-22

## 2021-10-22 RX ORDER — DULAGLUTIDE 1.5 MG/.5ML
1.5 INJECTION, SOLUTION SUBCUTANEOUS
Qty: 1 EACH | Refills: 0 | Status: SHIPPED | COMMUNITY
Start: 2021-10-22 | End: 2022-09-29 | Stop reason: DRUGHIGH

## 2021-10-22 RX ORDER — DULAGLUTIDE 3 MG/.5ML
3 INJECTION, SOLUTION SUBCUTANEOUS
Qty: 4 EACH | Refills: 1 | Status: SHIPPED | OUTPATIENT
Start: 2021-10-22 | End: 2022-09-29 | Stop reason: DRUGHIGH

## 2021-10-22 RX ORDER — FLASH GLUCOSE SCANNING READER
EACH MISCELLANEOUS
Qty: 1 EACH | Refills: 0 | Status: SHIPPED | OUTPATIENT
Start: 2021-10-22

## 2021-10-22 NOTE — PROGRESS NOTES
Chief Complaint   Patient presents with    Diabetes     Former pt of Dr Jazzy Barger       Patient was last seen: New Patient Visit last seen by Dr Hong Monge 5/19 - since then PCP following    General:   Doesn't always take medication  Seeing DM education - Tatiana Russell     DM2 in 1999  Initially metformin -too many GI issues  Then actos - while in hospital - stopped then  Was in hospital for [place] (5/20)- with lingering effects - only recently bad to work  Trulicity also stopped in hospital - worked well - only 0.75mg  H/o NEVAREZ - just switched to try other options   No DPP4, No SGLT     Insurance does not kick in until 11/11    A1c: current a1c is 13.9    DM Medications: tresiba 60u every day    Last Changes: no recent changes    Sugar Checks: checks up to 3 x day  Has nicole in past, needs Rx    AM: reports: 200s     PM: reports: 300s     LOWs:  denies low sugars     DIET: is working on it, has received diabetic traning (currently); works night shift- hopes to get on evening shift    EXERCISE: does not exercise- hopes to get back to walking     HTN: at goal, on ACE-I, on B-Blk, HTN followed by PCP     LIPIDS: at goal, on statin, lipids followed by PCP    RENAL: has normal renal function, has positive JUNIOR-r in past year, is on ACE-I     EYES: eye exam in past year, has retinopathy  VEI     FEET: has no current issues except minor swelling in past 5 weeks - PCP considering diuretic, some numbness and tingling; had abnormal foot exam 10/21- referred to Podiatry    DENTAL:  has seen dentist in past year     HEART:  no chest pain, shortness of breath or claudication, has no cardiac history, prior studies include:none     ASA:  is on aspirin     SYMPTOMS: no polyuria, thirst or blurred vision     THYROID: no known thyroid issue    DIABETES HISTORY: DM2 since 1999; see ablve      LABS/STUDIES:  2/23/21 a1c 481, GFR 57.5, a1c 14.7  6/29/21 a1c 15.1, JUNIOR-r +36.9  10/19/21 a1c 13.9, GFR > 60, gluc 392, Na 132, chol 279/222/98/136       Past Medical History:   Diagnosis Date    Bleeding of eye, bilateral     Chronic kidney disease     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    Diabetes ([facility] [address]     dx 1999    Hypertension     Ill-defined condition     elevated cholesterol    Menopause         Past Surgical History:   Procedure Laterality Date    HX CATARACT REMOVAL  05/2016    right eye    HX KNEE ARTHROSCOPY Right     HX SHOULDER ARTHROSCOPY Right     HX TONSILLECTOMY          Social History     Tobacco Use    Smoking status: Never Smoker    Smokeless tobacco: Never Used   Substance Use Topics    Alcohol use: Yes     Alcohol/week: 1.0 standard drinks     Types: 1 Glasses of wine per week     Comment: occasionally drinks wine      Employer:  [employer]       Blood pressure (!) 182/82, pulse 97, resp. rate 16, height 5' 7\" (1.702 m), weight 164 lb (74.4 kg), SpO2 100 %.    Has BP cuff at home - will recheck - stress getting here    Weight Metrics 10/22/2021 5/20/2019 5/1/2019 2/21/2019 10/2/2018 9/20/2018 12/5/2016   Weight 164 lb 137 lb 12.8 oz 137 lb 4 oz 139 lb 6.4 oz 137 lb 9.6 oz 143 lb 3.2 oz 130 lb   BMI 25.69 kg/m2 21.26 kg/m2 21.18 kg/m2 22.5 kg/m2 22.21 kg/m2 23.11 kg/m2 20.98 kg/m2        EXAM  - GENERAL: NCAT, Appears well nourished   - EYES: EOMI, non-icteric, no proptosis   - Ear/Nose/Throat: NCAT, no visible inflammation or masses   - CARDIOVASCULAR: no cyanosis, no visible JVD   - RESPIRATORY: respiratory effort normal without any distress or labored breathing   - MUSCULOSKELETAL: Normal ROM of neck and upper extremities observed   - SKIN: No rash on face  - NEUROLOGIC:  No facial asymmetry (Cranial nerve 7 motor function), No gaze palsy   - PSYCHIATRIC: Normal affect, Normal insight and judgement    Left Foot:   Visual Exam: normal    Pulse DP: 1+ (weak)   Filament test: reduced sensation    Vibratory sensation: Vibratory sensation: diminished       Right Foot:   Visual Exam: normal    Pulse DP: 1+ (weak)   Filament test: reduced sensation    Vibratory sensation: Vibratory sensation: diminished        Assessment/Plan:   1. Type 2 diabetes mellitus with hyperglycemia, with long-term current use of insulin (HCC)  On basal insulin alone; needs meal coverage  Add trulicity - samples given to take 0.75mg/wk x 2 then 1.5mg/wk x 2  Then increase to 3.0mg/wk (rx given and coupon for 1 free month)  Consider 4.5mg after that    Set alarm on phone so won't miss doses    Used ivanna in past- upgrade to PeerTrader so has alarms for highs/lows (one sample sensor given - to use phone as reader; can fill Rx when insurance kicks in 11/11)      - dulaglutide (Trulicity) 3 GV/7.8 mL pnij; 3 mg by SubCUTAneous route every seven (7) days. Dispense: 4 Each; Refill: 1  - dulaglutide (Trulicity) 8.61 GD/3.2 mL sub-q pen; 0.5 mL by SubCUTAneous route every seven (7) days. Dispense: 1 Each; Refill: 0  - dulaglutide (Trulicity) 1.5 UJ/1.9 mL sub-q pen; 0.5 mL by SubCUTAneous route every seven (7) days. Dispense: 1 Each; Refill: 0  - REFERRAL TO PODIATRY  - flash glucose sensor (FreeStyle Ivanna 2 Sensor) kit; Use every 14 days  Dispense: 1 Kit; Refill: 5  - flash glucose scanning reader (FreeStyle Ivanna 2 [place]) misc; Use with sensor  Dispense: 1 Each; Refill: 0  - flash glucose sensor (FreeStyle Ivanna 2 Sensor) kit; Use as directed  Dispense: 1 Kit; Refill: 0    2. Primary hypertension  On meds per PCP  High in office b/c stress of new visit  Has home monitor and will recheck    3. Mixed hyperlipidemia  Not at goal on statin 10/21  Per PCP    4. Decreased sensation of foot  Refer to podiatry    5. Diabetic retinopathy associated with type 2 diabetes mellitus, macular edema presence unspecified, unspecified laterality, unspecified retinopathy severity ([address]  Per [facility]    6. Diabetes mellitus with microalbuminuria ([address]  On ACE-I      Follow-up and Dispositions    · Return in about 3 months (around 1/22/2022).

## 2021-10-22 NOTE — LETTER
10/22/2021    Patient: Marcelino Mcduffie   YOB: 1950   Date of Visit: 10/22/2021     Rich Beard MD  [facility] [address]  Via Fax: [phone]    Dear Rich Beard MD,      Thank you for referring Ms. Mckinley Lacey to [facility] for evaluation. My notes for this consultation are attached. If you have questions, please do not hesitate to call me. I look forward to following your patient along with you.       Sincerely,    Hernan Tirado MD

## 2021-10-22 NOTE — PATIENT INSTRUCTIONS
Sample given of libre2 sensor -use Freestyle Libre2 ADRIAN on phone as the reader (but also reader and sensor Rx sent to pharmacy)    Start trulicity 7.51GW/ND x 2 weeks  Then 1.5mg/wk x 2 weeks  Then fill Rx for the 3.0mg/wk - -coupon for 1 free month  After 1 month, can consider 4.5mg dose     Continue insulin - don't miss doses     Podiatry referral made- Dr Emily Flynn

## 2022-03-18 PROBLEM — E78.5 DYSLIPIDEMIA: Status: ACTIVE | Noted: 2019-02-21

## 2022-03-19 PROBLEM — I10 HTN (HYPERTENSION), BENIGN: Status: ACTIVE | Noted: 2019-02-21

## 2022-08-15 ENCOUNTER — TRANSCRIBE ORDER (OUTPATIENT)
Dept: SCHEDULING | Age: 72
End: 2022-08-15

## 2022-08-15 DIAGNOSIS — Z12.31 SCREENING MAMMOGRAM FOR HIGH-RISK PATIENT: Primary | ICD-10-CM

## 2022-09-29 ENCOUNTER — OFFICE VISIT (OUTPATIENT)
Dept: ENDOCRINOLOGY | Age: 72
End: 2022-09-29
Payer: MEDICARE

## 2022-09-29 ENCOUNTER — TELEPHONE (OUTPATIENT)
Dept: ENDOCRINOLOGY | Age: 72
End: 2022-09-29

## 2022-09-29 VITALS
HEIGHT: 67 IN | SYSTOLIC BLOOD PRESSURE: 184 MMHG | WEIGHT: 141 LBS | HEART RATE: 98 BPM | DIASTOLIC BLOOD PRESSURE: 79 MMHG | BODY MASS INDEX: 22.13 KG/M2

## 2022-09-29 DIAGNOSIS — Z79.4 TYPE 2 DIABETES MELLITUS WITH HYPERGLYCEMIA, WITH LONG-TERM CURRENT USE OF INSULIN (HCC): Primary | ICD-10-CM

## 2022-09-29 DIAGNOSIS — E11.65 TYPE 2 DIABETES MELLITUS WITH HYPERGLYCEMIA, WITH LONG-TERM CURRENT USE OF INSULIN (HCC): Primary | ICD-10-CM

## 2022-09-29 PROCEDURE — 3017F COLORECTAL CA SCREEN DOC REV: CPT | Performed by: INTERNAL MEDICINE

## 2022-09-29 PROCEDURE — 1101F PT FALLS ASSESS-DOCD LE1/YR: CPT | Performed by: INTERNAL MEDICINE

## 2022-09-29 PROCEDURE — G8420 CALC BMI NORM PARAMETERS: HCPCS | Performed by: INTERNAL MEDICINE

## 2022-09-29 PROCEDURE — G8753 SYS BP > OR = 140: HCPCS | Performed by: INTERNAL MEDICINE

## 2022-09-29 PROCEDURE — 2022F DILAT RTA XM EVC RTNOPTHY: CPT | Performed by: INTERNAL MEDICINE

## 2022-09-29 PROCEDURE — 3046F HEMOGLOBIN A1C LEVEL >9.0%: CPT | Performed by: INTERNAL MEDICINE

## 2022-09-29 PROCEDURE — 1090F PRES/ABSN URINE INCON ASSESS: CPT | Performed by: INTERNAL MEDICINE

## 2022-09-29 PROCEDURE — 1123F ACP DISCUSS/DSCN MKR DOCD: CPT | Performed by: INTERNAL MEDICINE

## 2022-09-29 PROCEDURE — G8427 DOCREV CUR MEDS BY ELIG CLIN: HCPCS | Performed by: INTERNAL MEDICINE

## 2022-09-29 PROCEDURE — G8400 PT W/DXA NO RESULTS DOC: HCPCS | Performed by: INTERNAL MEDICINE

## 2022-09-29 PROCEDURE — G8536 NO DOC ELDER MAL SCRN: HCPCS | Performed by: INTERNAL MEDICINE

## 2022-09-29 PROCEDURE — 99214 OFFICE O/P EST MOD 30 MIN: CPT | Performed by: INTERNAL MEDICINE

## 2022-09-29 PROCEDURE — G9899 SCRN MAM PERF RSLTS DOC: HCPCS | Performed by: INTERNAL MEDICINE

## 2022-09-29 PROCEDURE — G8754 DIAS BP LESS 90: HCPCS | Performed by: INTERNAL MEDICINE

## 2022-09-29 PROCEDURE — G8432 DEP SCR NOT DOC, RNG: HCPCS | Performed by: INTERNAL MEDICINE

## 2022-09-29 RX ORDER — DULAGLUTIDE 1.5 MG/.5ML
1.5 INJECTION, SOLUTION SUBCUTANEOUS
Qty: 4 EACH | Refills: 1 | Status: SHIPPED | OUTPATIENT
Start: 2022-09-29

## 2022-09-29 RX ORDER — LISINOPRIL 40 MG/1
40 TABLET ORAL DAILY
COMMUNITY

## 2022-09-29 RX ORDER — METOCLOPRAMIDE 5 MG/1
TABLET ORAL
COMMUNITY
Start: 2022-07-23

## 2022-09-29 RX ORDER — PANTOPRAZOLE SODIUM 40 MG/1
TABLET, DELAYED RELEASE ORAL
COMMUNITY
Start: 2022-07-22

## 2022-09-29 RX ORDER — INSULIN LISPRO 100 [IU]/ML
6 INJECTION, SOLUTION INTRAVENOUS; SUBCUTANEOUS
COMMUNITY

## 2022-09-29 RX ORDER — GLUCOSAMINE/CHONDR SU A SOD 750-600 MG
TABLET ORAL
COMMUNITY

## 2022-09-29 RX ORDER — POTASSIUM CHLORIDE 750 MG/1
CAPSULE, EXTENDED RELEASE ORAL
COMMUNITY

## 2022-09-29 RX ORDER — ATENOLOL 50 MG/1
50 TABLET ORAL DAILY
COMMUNITY

## 2022-09-29 RX ORDER — ATORVASTATIN CALCIUM 20 MG/1
20 TABLET, FILM COATED ORAL DAILY
COMMUNITY
Start: 2022-08-30

## 2022-09-29 RX ORDER — AMLODIPINE BESYLATE 5 MG/1
TABLET ORAL
COMMUNITY

## 2022-09-29 RX ORDER — DULAGLUTIDE 3 MG/.5ML
3 INJECTION, SOLUTION SUBCUTANEOUS
Qty: 4 EACH | Refills: 5 | Status: SHIPPED | OUTPATIENT
Start: 2022-09-29

## 2022-09-29 RX ORDER — INSULIN DEGLUDEC 100 U/ML
20 INJECTION, SOLUTION SUBCUTANEOUS
COMMUNITY

## 2022-09-29 RX ORDER — LORATADINE 10 MG/1
TABLET ORAL
COMMUNITY

## 2022-09-29 NOTE — PROGRESS NOTES
Chief Complaint   Patient presents with    Diabetes       Patient was last seen: 10-11 months ago 10/22/21  OVERDUE FOR VISIT    Nov - Mar - in hospital - sepsis  Had right BKA     Has Medicare    A1c: last a1c was- does not know - by PCP     DM Medications:  Tresiba 20u at night  Humalog (lispro) - 6u with meals - if < 934 then held  Trulicity 9.75KO once a week     Last Changes:lots of changes since last visit by other MDs    Sugar Checks: checks more than 4 times a day and uses [employer] CGM  -did not bring reader    AM: reports:  372 this AM, usually 200+    PM: reports:   pre-meal lowest ~190    LOWs:  denies low sugars     DIET: is working on it, has received diabetic traning (currently); works night shift- hopes to get on evening shift    EXERCISE: does not exercise-just finished in home PT, starts outPt PT this week; using walker    HTN: at goal, on ACE-I, on B-Blk, HTN followed by PCP     LIPIDS: at goal, on statin, lipids followed by PCP    RENAL: has normal renal function, has positive JUNIOR-r in past year, is on ACE-I     EYES: overdue for eye exam, has retinopathy  VEI     FEET: has no current issues referred to Podiatry (saw during rehab)    DENTAL:  overdue for dentist     HEART:  no chest pain, shortness of breath or claudication, has no cardiac history, prior studies include:none     ASA:  does not take aspirin or other blood thinner     SYMPTOMS: no polyuria, thirst or blurred vision     THYROID: no known thyroid issue    DIABETES HISTORY:   From initial visit 10/22/21  DM2 in [facility]  Initially metformin -too many GI issues  Then actos - while in hospital - stopped then  Was in hospital for Paola (5/20)- with lingering effects - only recently bad to work  Trulicity also stopped in hospital - worked well - only 0.75mg  H/o NEVAREZ - just switched to try other options   No DPP4, No SGLT     LABS/STUDIES:  2/23/21 a1c 481, GFR 57.5, a1c 14.7  6/29/21 a1c 15.1, JUNIOR-r +36.9  10/19/21 a1c 13.9, GFR > 60, gluc 392, Na 132, chol 279/222/98/136    Past Medical History:   Diagnosis Date    Bleeding of eye, bilateral     Chronic kidney disease     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    COVID-19 long hauler     Diabetes ([address]     dx 1999    Hypertension     Ill-defined condition     elevated cholesterol    Menopause       Employer:  [employer]       Blood pressure (!) 184/79, pulse 98, height 5' 7\" (1.702 m), weight 141 lb (64 kg). Weight Metrics 9/29/2022 10/22/2021 5/20/2019 5/1/2019 2/21/2019 10/2/2018 9/20/2018   Weight 141 lb 164 lb 137 lb 12.8 oz 137 lb 4 oz 139 lb 6.4 oz 137 lb 9.6 oz 143 lb 3.2 oz   BMI 22.08 kg/m2 25.69 kg/m2 21.26 kg/m2 21.18 kg/m2 22.5 kg/m2 22.21 kg/m2 23.11 kg/m2        EXAM  - not done today       Assessment/Plan:   1. Type 2 diabetes mellitus with hyperglycemia, with long-term current use of insulin (HCC)  Increase Tresiba to 30 units once a day    Take 8 units of Lispro before every meal  Can adjust based on what eating and prior insulin response    Then add scale if sugar is high (before meals)  200-250 4 units  Over 250 8 units    Increase Trulicity to 2.6OM once a week (rx sent in )  Then after a month increase to 3.0 mg once a week (written Rx given )    Bring [employer] READER to each visit so we can download the data    2. Primary hypertension  On meds per PCP  Has home monitor and will recheck    3. Mixed hyperlipidemia  Not at goal on statin 10/21  Per PCP    4. Decreased sensation of foot  Refer to podiatry - saw in Rehab - to make appt    5. Diabetic retinopathy associated with type 2 diabetes mellitus, macular edema presence unspecified, unspecified laterality, unspecified retinopathy severity ([address]  Per Ophtho    6. Diabetes mellitus with microalbuminuria ([address]  On ACE-I      Follow-up and Dispositions    Return in about 3 months (around 12/29/2022).

## 2022-09-29 NOTE — PATIENT INSTRUCTIONS
Increase Tresiba to 30 units once a day    Take 8 units of Lispro before every meal  Can adjust based on what eating and prior insulin response    Then add scale if sugar is high (before meals)  200-250 4 units  Over 250 8 units    Increase Trulicity to 0.9ME once a week (rx sent in )  Then after a month increase to 3.0 mg once a week (written Rx given )    Bring [employer] READER to each visit so we can download the data

## 2022-09-29 NOTE — LETTER
9/29/2022    Patient: Madeline Danielle   YOB: 1950   Date of Visit: 9/29/2022     Merlinda English, MD  [address] [place] [phone]  Via Fax: [phone]    Dear Merlinda English, MD,      Thank you for referring Ms. Cesar Gordillo to [facility] for evaluation. My notes for this consultation are attached. If you have questions, please do not hesitate to call me. I look forward to following your patient along with you.       Sincerely,    Sadiq Meredith MD

## 2022-09-29 NOTE — TELEPHONE ENCOUNTER
I called the PCP's office and the last A1c was done on 7/23/2022 and the result was 14. 5. they are going to fax a copy of this as well  Nidia Gotti

## 2022-10-01 ENCOUNTER — HOSPITAL ENCOUNTER (OUTPATIENT)
Dept: MAMMOGRAPHY | Age: 72
Discharge: HOME | End: 2022-10-01
Attending: FAMILY MEDICINE
Payer: MEDICARE

## 2022-10-01 DIAGNOSIS — Z12.31 SCREENING MAMMOGRAM FOR HIGH-RISK PATIENT: ICD-10-CM

## 2022-10-01 PROCEDURE — 77063 BREAST TOMOSYNTHESIS BI: CPT

## 2022-10-17 ENCOUNTER — TELEPHONE (OUTPATIENT)
Dept: ENDOCRINOLOGY | Age: 72
End: 2022-10-17

## 2022-10-17 NOTE — TELEPHONE ENCOUNTER
See how patient doing with insulin changes made 2 weeks ago (added meal insulin). Uses nicoel, but reader, not phone.

## 2022-11-23 NOTE — TELEPHONE ENCOUNTER
I attempted to call Ms. Ashlee Matson and reached a voice  mail.  I left a message celi [email or web address] to give me a call back  Tommy Mays

## 2022-11-25 NOTE — TELEPHONE ENCOUNTER
I attempted to call Ms. Milka Eun again and again I reached a voice mail.  I left another message asking her to give me a call back  Rylee Fischer

## 2023-01-10 ENCOUNTER — OFFICE VISIT (OUTPATIENT)
Dept: ENDOCRINOLOGY | Age: 73
End: 2023-01-10
Payer: MEDICARE

## 2023-01-10 VITALS
SYSTOLIC BLOOD PRESSURE: 186 MMHG | DIASTOLIC BLOOD PRESSURE: 70 MMHG | BODY MASS INDEX: 24.33 KG/M2 | WEIGHT: 155 LBS | HEART RATE: 94 BPM | HEIGHT: 67 IN

## 2023-01-10 DIAGNOSIS — E11.65 TYPE 2 DIABETES MELLITUS WITH HYPERGLYCEMIA, WITH LONG-TERM CURRENT USE OF INSULIN (HCC): ICD-10-CM

## 2023-01-10 DIAGNOSIS — Z79.4 TYPE 2 DIABETES MELLITUS WITH HYPERGLYCEMIA, WITH LONG-TERM CURRENT USE OF INSULIN (HCC): ICD-10-CM

## 2023-01-10 PROCEDURE — 1090F PRES/ABSN URINE INCON ASSESS: CPT | Performed by: INTERNAL MEDICINE

## 2023-01-10 PROCEDURE — 3046F HEMOGLOBIN A1C LEVEL >9.0%: CPT | Performed by: INTERNAL MEDICINE

## 2023-01-10 PROCEDURE — 2022F DILAT RTA XM EVC RTNOPTHY: CPT | Performed by: INTERNAL MEDICINE

## 2023-01-10 PROCEDURE — 1101F PT FALLS ASSESS-DOCD LE1/YR: CPT | Performed by: INTERNAL MEDICINE

## 2023-01-10 PROCEDURE — G8420 CALC BMI NORM PARAMETERS: HCPCS | Performed by: INTERNAL MEDICINE

## 2023-01-10 PROCEDURE — G8427 DOCREV CUR MEDS BY ELIG CLIN: HCPCS | Performed by: INTERNAL MEDICINE

## 2023-01-10 PROCEDURE — 3017F COLORECTAL CA SCREEN DOC REV: CPT | Performed by: INTERNAL MEDICINE

## 2023-01-10 PROCEDURE — 99214 OFFICE O/P EST MOD 30 MIN: CPT | Performed by: INTERNAL MEDICINE

## 2023-01-10 PROCEDURE — G8536 NO DOC ELDER MAL SCRN: HCPCS | Performed by: INTERNAL MEDICINE

## 2023-01-10 PROCEDURE — G8400 PT W/DXA NO RESULTS DOC: HCPCS | Performed by: INTERNAL MEDICINE

## 2023-01-10 PROCEDURE — G8432 DEP SCR NOT DOC, RNG: HCPCS | Performed by: INTERNAL MEDICINE

## 2023-01-10 PROCEDURE — G9899 SCRN MAM PERF RSLTS DOC: HCPCS | Performed by: INTERNAL MEDICINE

## 2023-01-10 PROCEDURE — 3077F SYST BP >= 140 MM HG: CPT | Performed by: INTERNAL MEDICINE

## 2023-01-10 PROCEDURE — 3078F DIAST BP <80 MM HG: CPT | Performed by: INTERNAL MEDICINE

## 2023-01-10 PROCEDURE — 1123F ACP DISCUSS/DSCN MKR DOCD: CPT | Performed by: INTERNAL MEDICINE

## 2023-01-10 RX ORDER — INSULIN LISPRO 100 [IU]/ML
INJECTION, SOLUTION INTRAVENOUS; SUBCUTANEOUS
Qty: 6 ML | Refills: 5 | Status: SHIPPED | OUTPATIENT
Start: 2023-01-10

## 2023-01-10 RX ORDER — PEN NEEDLE, DIABETIC 32GX 5/32"
NEEDLE, DISPOSABLE MISCELLANEOUS
COMMUNITY
Start: 2022-12-05

## 2023-01-10 RX ORDER — FLASH GLUCOSE SENSOR
KIT MISCELLANEOUS
Qty: 1 KIT | Refills: 5 | Status: SHIPPED | OUTPATIENT
Start: 2023-01-10

## 2023-01-10 RX ORDER — DULAGLUTIDE 1.5 MG/.5ML
1.5 INJECTION, SOLUTION SUBCUTANEOUS
Qty: 4 EACH | Refills: 5 | Status: SHIPPED | OUTPATIENT
Start: 2023-01-10

## 2023-01-10 RX ORDER — ATENOLOL 25 MG/1
25 TABLET ORAL DAILY
COMMUNITY
Start: 2022-12-12

## 2023-01-10 RX ORDER — INSULIN GLARGINE 100 [IU]/ML
40 INJECTION, SOLUTION SUBCUTANEOUS
COMMUNITY
Start: 2022-12-05

## 2023-01-10 RX ORDER — AMLODIPINE BESYLATE 10 MG/1
TABLET ORAL
COMMUNITY
Start: 2022-12-05

## 2023-01-10 RX ORDER — ATORVASTATIN CALCIUM 40 MG/1
TABLET, FILM COATED ORAL
COMMUNITY
Start: 2022-12-05

## 2023-01-10 RX ORDER — LISINOPRIL 10 MG/1
TABLET ORAL
COMMUNITY
Start: 2022-12-12

## 2023-01-10 RX ORDER — LORAZEPAM 0.5 MG/1
TABLET ORAL
COMMUNITY
Start: 2023-01-06

## 2023-01-10 NOTE — PROGRESS NOTES
Chief Complaint   Patient presents with    Diabetes         Patient was last seen: 3 months ago, 4 months ago 9/29/2022     In hospital for 8 days b/c sugars were > 600 -- was not taking her insulin  Somehow got off meal insulin after discharged; also trulicity stopped    Sugars were <200 with changes made last time    Getting meals from Mom's meal -carb controlled; and snacks and Juice    Has Medicare    A1c: last a1c was- >14 11/29/22    DM Medications:  Lantus 40 units 8:30-9pm  Take 8 units of Lispro before every meal  Scale:  200-250 4 units  Over 202 8 units    Trulicity 6.6II     Not using CGM - says no longer covered - was picking up at pharmacy     Last Changes: increased tresiba, added meal insulin (was taking if 6u if > 150 only) + scale, increased trulicity   Then in hospital and things changed    Sugar Checks: checks more than 4 times a day and uses [employer] CGM  -currently not getting    AM: reports:  300s    PM: reports:   200s over the day; HS > 200    LOWs:  denies low sugars     DIET: is working on it, has received diabetic traning (currently); works night shift- hopes to get on evening shift    EXERCISE: does not exercise-just finished in home PT, starts outPt PT this week; using walker    HTN: at goal, on ACE-I, on B-Blk, HTN followed by PCP     LIPIDS: at goal, on statin, lipids followed by PCP    RENAL: has normal renal function, has positive JUNIOR-r in past year, is on ACE-I     EYES: overdue for eye exam, has retinopathy  VEI     FEET: has no current issues referred to Podiatry (saw during rehab) has BKA    DENTAL:  overdue for dentist     HEART:  no chest pain, shortness of breath or claudication, has no cardiac history, prior studies include:none     ASA:  does not take aspirin or other blood thinner     SYMPTOMS: no polyuria, thirst or blurred vision     THYROID: no known thyroid issue    DIABETES HISTORY:   From initial visit 10/22/21  DM2 in [facility]  Initially metformin -too many GI issues  Then actos - while in hospital - stopped then  Was in hospital for Pa[place] (5/20)- with lingering effects - only recently bad to work  Trulicity also stopped in hospital - worked well - only 0.75mg  H/o NEVAREZ - just switched to try other options   No DPP4, No SGLT     LABS/STUDIES:  2/23/21 a1c 481, GFR 57.5, a1c 14.7  6/29/21 a1c 15.1, JUNIOR-r +36.9  10/19/21 a1c 13.9, GFR > 60, gluc 392, Na 132, chol 279/222/98/136    4/15/22 a1c 9.7  7/23/22 a1c 14.5   10/24/22 glucose in PCP labs 518    Lab Results   Component Value Date/Time    Hemoglobin A1c 14.4 (H) 05/01/2019 01:22 PM    Hemoglobin A1c 14.5 (H) 12/06/2016 03:47 AM    Hemoglobin A1c 9.4 (H) 03/01/2010 09:21 AM    Hemoglobin A1c, External 15.9 08/13/2018 12:00 AM    Hemoglobin A1c, External 15.9 08/10/2018 12:00 AM    Glucose 306 (H) 12/06/2016 03:47 AM    Glucose (POC) 210 (H) 12/06/2016 04:28 PM    GFR est AA >60 12/06/2016 03:47 AM    GFR est non-AA >60 12/06/2016 03:47 AM    LDL, calculated 146.8 (H) 12/06/2016 03:47 AM    Creatinine 0.89 12/06/2016 03:47 AM        Past Medical History:   Diagnosis Date    Bleeding of eye, bilateral     Chronic kidney disease     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    COVID-19 long hauler     Di[facility] ([facility]     dx 1999    Hypertension     Ill-defined condition     elevated cholesterol    Menopause       Employer:  [employer]       Blood pressure (!) 186/70, pulse 94, height 5' 7\" (1.702 m), weight 155 lb (70.3 kg). Weight Metrics 1/10/2023 9/29/2022 10/22/2021 5/20/2019 5/1/2019 2/21/2019 10/2/2018   Weight 155 lb 141 lb 164 lb 137 lb 12.8 oz 137 lb 4 oz 139 lb 6.4 oz 137 lb 9.6 oz   BMI 24.28 kg/m2 22.08 kg/m2 25.69 kg/m2 21.26 kg/m2 21.18 kg/m2 22.5 kg/m2 22.21 kg/m2        EXAM  - not done today       Assessment/Plan:   1.  Type 2 diabetes mellitus with hyperglycemia, with long-term current use of insulin (HCC)  Increase lantus to 45 units once a day    Add Lispro 4 units at meals  Add extra Lispro if sugars are high  200-250 3 units  Over 250 6 units    Add back Trulicity 0.7FV once a week     Ivanna 2 sensors sent to [facility] [address] [address] - let us know if any issues with this    she has the following indications to continue treatment with Freestyle Ivanna:  1) she has type 2 diabetes and is on an intensive insulin regimen with 4 injections per day  2) she tests her blood sugar 4 times per day and makes treatment decisions off her blood sugar readings and freestyle ivanna sensor readings  3) she requires frequent adjustments to her insulin injection doses based on her freestyle ivanna sensor readings  4) she has benefited from therapeutic continuous glucose monitoring and I recommend that she continue with this  5) she is seen in my office every 3-4 months      2. Primary hypertension  On meds per PCP  Has home monitor and will recheck    3. Mixed hyperlipidemia  Not at goal on statin 10/21  Per PCP    4. Decreased sensation of foot  Refer to podiatry - saw in Rehab - did not make appt yet    5. Diabetic retinopathy associated with type 2 diabetes mellitus, macular edema presence unspecified, unspecified laterality, unspecified retinopathy severity ([address]  Per Ophtho    6. Diabetes mellitus with microalbuminuria ([address]  On ACE-I      Follow-up and Dispositions    Return in about 6 weeks (around 2/21/2023).

## 2023-01-10 NOTE — LETTER
1/10/2023    Patient: Danii Hinojosa   YOB: 1950   Date of Visit: 1/10/2023     Carlie Jacobs MD  [address] [place] [phone]  Via Fax: [phone]    Dear Carlie Jacobs MD,      Thank you for referring Ms. Jose Kapadia to [facility] for evaluation. My notes for this consultation are attached. If you have questions, please do not hesitate to call me. I look forward to following your patient along with you.       Sincerely,    Gabby Negron MD

## 2023-01-10 NOTE — PATIENT INSTRUCTIONS
Increase lantus to 45 units once a day    Add Lispro 4 units at meals  Add extra Lispro if sugars are high  200-250 3 units  Over 250 6 units    Add back Trulicity 0.9NS once a week     Ivanna 2 sensors sent to [facility] [address] - let us know if any issues with this

## 2023-05-31 LAB — HBA1C MFR BLD HPLC: 8.9 %

## 2023-07-20 ENCOUNTER — HOSPITAL ENCOUNTER (OUTPATIENT)
Facility: HOSPITAL | Age: 73
Discharge: HOME | End: 2023-07-20
Payer: MEDICARE

## 2023-07-20 VITALS
RESPIRATION RATE: 18 BRPM | WEIGHT: 138 LBS | TEMPERATURE: 98.9 F | DIASTOLIC BLOOD PRESSURE: 77 MMHG | BODY MASS INDEX: 22.18 KG/M2 | HEIGHT: 66 IN | SYSTOLIC BLOOD PRESSURE: 199 MMHG

## 2023-07-20 DIAGNOSIS — L97.929 VENOUS STASIS ULCER OF LEFT LOWER EXTREMITY (HCC): Primary | ICD-10-CM

## 2023-07-20 DIAGNOSIS — L97.222 NON-PRESSURE CHRONIC ULCER OF LEFT CALF WITH FAT LAYER EXPOSED (HCC): ICD-10-CM

## 2023-07-20 DIAGNOSIS — I83.023 VENOUS STASIS ULCER OF LEFT ANKLE WITH FAT LAYER EXPOSED WITH VARICOSE VEINS (HCC): ICD-10-CM

## 2023-07-20 DIAGNOSIS — L97.322 VENOUS STASIS ULCER OF LEFT ANKLE WITH FAT LAYER EXPOSED WITH VARICOSE VEINS (HCC): ICD-10-CM

## 2023-07-20 DIAGNOSIS — I83.029 VENOUS STASIS ULCER OF LEFT LOWER EXTREMITY (HCC): Primary | ICD-10-CM

## 2023-07-20 PROCEDURE — 99203 OFFICE O/P NEW LOW 30 MIN: CPT

## 2023-07-20 PROCEDURE — 11042 DBRDMT SUBQ TIS 1ST 20SQCM/<: CPT

## 2023-07-20 RX ORDER — GABAPENTIN 100 MG/1
100 CAPSULE ORAL 2 TIMES DAILY
COMMUNITY

## 2023-07-20 NOTE — DISCHARGE INSTRUCTIONS
Discharge Instructions/Wound Orders  [address]   Telephone: [phone]    NAME:  Christina Centeno OF BIRTH:  1950  MEDICAL RECORD NUMBER:  [id number]  DATE:  July 20, 2023  Wound Care Orders:  Left lower leg wound - Cleanse with baby shampoo. Let lather sit 2-3 minutes. Rinse and pat dry. Apply kaci to wound bed. Cover with abd pad. Secure with gauze roll and tape. Place double layer tubigrip, size F, for mild compression. Change every other day or more often if soiled. Activity:  [x] Activity as tolerated:     [] Remain off Work:       [] May return to full duty work:                                     [] Return to work with restrictions:  Dietary:  [] Diet as tolerated      [x] Diabetic Diet            [] Increase Protein: examples (Meat, cheese, eggs, greek yogurt, fish, nuts)          [x] [address]  Return Appointment:  [x] Return Appointment: With Dr. Clarice Bailey in 1 week. [] Ordered tests:       [address] [place] [address] Information: Should you experience any significant changes in your wound(s) or have questions about your wound care, please contact the [address] [place] [address] at [address] 8:00 am - 4:30. If you need help with your wound outside these hours and cannot wait until we are again available, contact your PCP or go to the hospital emergency room. PLEASE NOTE: IF YOU ARE UNABLE TO OBTAIN WOUND SUPPLIES, CONTINUE TO USE THE SUPPLIES YOU HAVE AVAILABLE UNTIL YOU ARE ABLE TO REACH US. IT IS MOST IMPORTANT TO KEEP THE WOUND COVERED AT ALL TIMES.      Physician Signature:_______________________    Date: ___________ Time:  ____________

## 2023-07-20 NOTE — FLOWSHEET NOTE
07/20/23 1441   Wound 07/20/23 Pretibial Distal;Left   Date First Assessed/Time First Assessed: 07/20/23 1351   Wound Approximate Age at First Assessment (Weeks): 8 weeks  Primary Wound Type: Venous Ulcer  Location: Pretibial  Wound Location Orientation: Distal;Left   Dressing Status New dressing applied   Dressing/Treatment   (kaci, gauze, roll gauze, tape double layer tubigrip.)

## 2023-07-21 PROBLEM — I83.023 VENOUS STASIS ULCER OF LEFT ANKLE WITH FAT LAYER EXPOSED WITH VARICOSE VEINS (HCC): Status: ACTIVE | Noted: 2023-07-21

## 2023-07-21 PROBLEM — L97.322 VENOUS STASIS ULCER OF LEFT ANKLE WITH FAT LAYER EXPOSED WITH VARICOSE VEINS (HCC): Status: ACTIVE | Noted: 2023-07-21

## 2023-07-21 PROBLEM — L97.222 NON-PRESSURE CHRONIC ULCER OF LEFT CALF WITH FAT LAYER EXPOSED (HCC): Status: ACTIVE | Noted: 2023-07-21

## 2023-07-21 RX ORDER — GINSENG 100 MG
CAPSULE ORAL ONCE
OUTPATIENT
Start: 2023-07-21 | End: 2023-07-21

## 2023-07-21 RX ORDER — BETAMETHASONE DIPROPIONATE 0.05 %
OINTMENT (GRAM) TOPICAL ONCE
OUTPATIENT
Start: 2023-07-21 | End: 2023-07-21

## 2023-07-21 RX ORDER — BACITRACIN ZINC AND POLYMYXIN B SULFATE 500; 1000 [USP'U]/G; [USP'U]/G
OINTMENT TOPICAL ONCE
OUTPATIENT
Start: 2023-07-21 | End: 2023-07-21

## 2023-07-21 RX ORDER — SODIUM CHLOR/HYPOCHLOROUS ACID 0.033 %
SOLUTION, IRRIGATION IRRIGATION ONCE
OUTPATIENT
Start: 2023-07-21 | End: 2023-07-21

## 2023-07-21 RX ORDER — CLOBETASOL PROPIONATE 0.5 MG/G
OINTMENT TOPICAL ONCE
OUTPATIENT
Start: 2023-07-21 | End: 2023-07-21

## 2023-07-21 RX ORDER — IBUPROFEN 200 MG
TABLET ORAL ONCE
OUTPATIENT
Start: 2023-07-21 | End: 2023-07-21

## 2023-07-21 RX ORDER — GENTAMICIN SULFATE 1 MG/G
OINTMENT TOPICAL ONCE
OUTPATIENT
Start: 2023-07-21 | End: 2023-07-21

## 2023-07-24 ENCOUNTER — TELEPHONE (OUTPATIENT)
Facility: HOSPITAL | Age: 73
End: 2023-07-24

## 2023-07-24 ENCOUNTER — HOME HEALTH ADMISSION (OUTPATIENT)
Dept: HOME HEALTH SERVICES | Facility: HOME HEALTH | Age: 73
End: 2023-07-24
Payer: MEDICARE

## 2023-07-24 NOTE — TELEPHONE ENCOUNTER
Call received from Catrina at [facility]. They are not able to see pt until 7/28/23. They will see her sooner if they have a cancellation, 7/25 or 7/26. Pt has clinic appt 7/27. Catrina stated she will contact pt and family to notify them of schedule.

## 2023-07-25 ENCOUNTER — HOME CARE VISIT (OUTPATIENT)
Facility: HOME HEALTH | Age: 73
End: 2023-07-25

## 2023-07-25 VITALS
TEMPERATURE: 97.7 F | DIASTOLIC BLOOD PRESSURE: 70 MMHG | SYSTOLIC BLOOD PRESSURE: 132 MMHG | HEART RATE: 79 BPM | OXYGEN SATURATION: 99 % | RESPIRATION RATE: 18 BRPM

## 2023-07-25 PROCEDURE — 0221000100 HH NO PAY CLAIM PROCEDURE

## 2023-07-25 PROCEDURE — G0299 HHS/HOSPICE OF RN EA 15 MIN: HCPCS

## 2023-07-25 NOTE — HOME HEALTH
Material Utilized: Admission handbook    Specific plan for next visit: wound care     Plan of care and admission to home health status called to attending physician. The following practitioner has agreed to sign the ongoing POC Dr. Bailey Fitzpatrick, and was notified of the following: visit frequency of 1 visit every 2 days, 3 prn    Interdisciplinary communication with: MD to request PT eval order    PCP: Dr. Nader Ho  Next scheduled doctor appointment: 7/17 at [address] [place] [address],[address] wound clinic  Patient/Caregiver instructed to keep follow up appointment because lack of follow through with physician appointments could result in discontinuation of home care services for non-compliance. Patient/Caregiver verbalize knowledge of above through teach back with 100 percent accuracy. Emergency Preparedness: Patient/Caregiver instructed in the following:  Have one gallon of water per person for at least 3 days on hand. Have non-perishable food for at least 3 days that do not need to be cooked. Have flashlights and batteries. Charge your cell phones and any back up lithium batteries for your cell phones. Have 3+ days of back up oxygen in your home. Have a phone in your home that is hard wired and does not require power. Have medication for a week in your home. Make sure you have a caregiver in the home to provide care in case your home health nurse cannot get to your house. Make sure you have all of your paperwork i.e. written emergency preparedness plan, Identification, insurance cards, DME phone number, physician and pharmacy phone number, agency phone number, and your medications in one place for easy access and in a zip lock bag to protect them. Take your Admission Handbook, written emergency preparedness plan, written medication list, necessary supplies and folder if you relocate in the event of an emergency, if possible. Call agency if you relocate so we can contact you.     Patient/Caregiver verbalize knowledge of above through teach

## 2023-07-27 ENCOUNTER — HOSPITAL ENCOUNTER (OUTPATIENT)
Facility: HOSPITAL | Age: 73
Discharge: HOME | End: 2023-07-27
Payer: MEDICARE

## 2023-07-27 ENCOUNTER — HOME CARE VISIT (OUTPATIENT)
Dept: HOME HEALTH SERVICES | Facility: HOME HEALTH | Age: 73
End: 2023-07-27

## 2023-07-27 VITALS
TEMPERATURE: 98 F | HEART RATE: 82 BPM | SYSTOLIC BLOOD PRESSURE: 134 MMHG | RESPIRATION RATE: 18 BRPM | DIASTOLIC BLOOD PRESSURE: 78 MMHG

## 2023-07-27 DIAGNOSIS — L97.322 VENOUS STASIS ULCER OF LEFT ANKLE WITH FAT LAYER EXPOSED WITH VARICOSE VEINS (HCC): ICD-10-CM

## 2023-07-27 DIAGNOSIS — L97.222 NON-PRESSURE CHRONIC ULCER OF LEFT CALF WITH FAT LAYER EXPOSED (HCC): Primary | ICD-10-CM

## 2023-07-27 DIAGNOSIS — I83.023 VENOUS STASIS ULCER OF LEFT ANKLE WITH FAT LAYER EXPOSED WITH VARICOSE VEINS (HCC): ICD-10-CM

## 2023-07-27 PROCEDURE — 99213 OFFICE O/P EST LOW 20 MIN: CPT

## 2023-07-27 PROCEDURE — 99213 OFFICE O/P EST LOW 20 MIN: CPT | Performed by: SURGERY

## 2023-07-27 RX ORDER — SODIUM CHLOR/HYPOCHLOROUS ACID 0.033 %
SOLUTION, IRRIGATION IRRIGATION ONCE
OUTPATIENT
Start: 2023-07-27 | End: 2023-07-27

## 2023-07-27 RX ORDER — GENTAMICIN SULFATE 1 MG/G
OINTMENT TOPICAL ONCE
OUTPATIENT
Start: 2023-07-27 | End: 2023-07-27

## 2023-07-27 RX ORDER — IBUPROFEN 200 MG
TABLET ORAL ONCE
OUTPATIENT
Start: 2023-07-27 | End: 2023-07-27

## 2023-07-27 RX ORDER — BETAMETHASONE DIPROPIONATE 0.05 %
OINTMENT (GRAM) TOPICAL ONCE
OUTPATIENT
Start: 2023-07-27 | End: 2023-07-27

## 2023-07-27 RX ORDER — GINSENG 100 MG
CAPSULE ORAL ONCE
OUTPATIENT
Start: 2023-07-27 | End: 2023-07-27

## 2023-07-27 RX ORDER — BACITRACIN ZINC AND POLYMYXIN B SULFATE 500; 1000 [USP'U]/G; [USP'U]/G
OINTMENT TOPICAL ONCE
OUTPATIENT
Start: 2023-07-27 | End: 2023-07-27

## 2023-07-27 RX ORDER — CLOBETASOL PROPIONATE 0.5 MG/G
OINTMENT TOPICAL ONCE
OUTPATIENT
Start: 2023-07-27 | End: 2023-07-27

## 2023-07-27 NOTE — PROGRESS NOTES
WOUND CARE PROGRESS       Subjective:     Patient  Hx below from  Dr Geovanny Piper, note reviewed  Nando Welch is a 81 y.o. female who presents today for an initial evaluation of a wound/ulcer. Patient is new to the wound center. Wound duration:  2 month(s). She comes with her daughter     History of Wound Context: Ms Aric Schmid is a 82 yo female with a left lower leg ulcer first noticed by the patient about 8 weeks ago after presenting with a swollen leg. She denied any pain or erythema, traumatic event to the site. Has no fever. She was seen by her PCP and was provided with Home Health wound care services for several weeks. Patient states this was discontinued for reasons unbeknownst to her. Since then, the wound has continued to drain clear fluids. She is referred for further wound care management. Patient has Type 2 DM and is status post right BKA several years ago for uncontrolled bleeding diabetic ulcer. She claims her A1c runs around 8 range. Has history of HTN and CKD. She denies any smoking. 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 She does have a good biphasic left foot dorsalis pedis pulse by Doppler    It sounds like patient has longstanding left leg edema. Review of Systems   Constitutional:         See HPI, no pain in the left calf   All other systems reviewed and are negative. Objective: There were no vitals taken for this visit. No data recorded. Physical Exam  Vitals and nursing note reviewed. Exam conducted with a chaperone present. Constitutional:       General: She is not in acute distress. Appearance: Normal appearance. She is not ill-appearing.    Cardiovascular:      Rate and

## 2023-07-27 NOTE — DISCHARGE INSTRUCTIONS
Discharge Instructions for          [address]  Phone: [phone] Fax: [phone]    NAME:  Lainey Guerrero OF BIRTH:  1950  MEDICAL RECORD NUMBER:  [id number]  DATE:  July 27, 2023  WOUND CARE ORDERS:  Left lower leg wound - Cleanse with baby shampoo. Let lather sit 2-3 minutes. Rinse and pat dry. Apply kaci to wound bed. Cover with abd pad. Secure with gauze roll and tape. Place double layer tubigrip, size F, for mild compression. Change every other day or more often if soiled. Activity:  [x] Activity as tolerated: Elevate leg(s) above the level of the heart when sitting. [x] Avoid prolonged standing in one place. [x] Do no get dressing/wrap wet. Dietary:  [] Diet as tolerated      [x] Diabetic Diet            [] Increase Protein: examples (Meat, cheese, eggs, greek yogurt, fish, nuts)          [] Anton Therapeutic Nutrition Powder     Return Appointment:  [x] Return Appointment: With Dr. Haroon Gar in 1 Week     Electronically signed Darlene Hopson RN on 7/27/2023 at [facility]: Should you experience any significant changes in your wound(s) or have questions about your wound care, please contact the [address] [place] [address] at [address] [place] [address] 8:00 am - 4:30. If you need help with your wound outside these hours and cannot wait until we are again available, contact your PCP or go to the hospital emergency room. PLEASE NOTE: IF YOU ARE UNABLE TO OBTAIN WOUND SUPPLIES, CONTINUE TO USE THE SUPPLIES YOU HAVE AVAILABLE UNTIL YOU ARE ABLE TO REACH US. IT IS MOST IMPORTANT TO KEEP THE WOUND COVERED AT ALL TIMES.      Physician Signature:_______________________    Date: ___________ Time:  ____________

## 2023-07-29 ENCOUNTER — HOME CARE VISIT (OUTPATIENT)
Facility: HOME HEALTH | Age: 73
End: 2023-07-29

## 2023-07-29 VITALS
SYSTOLIC BLOOD PRESSURE: 132 MMHG | OXYGEN SATURATION: 98 % | DIASTOLIC BLOOD PRESSURE: 74 MMHG | HEART RATE: 82 BPM | RESPIRATION RATE: 16 BRPM | TEMPERATURE: 99.2 F

## 2023-07-29 PROCEDURE — G0299 HHS/HOSPICE OF RN EA 15 MIN: HCPCS

## 2023-07-30 NOTE — HOME HEALTH
Subjective Patient states she is not having any pain  Falls since last visit No(if yes complete the Fall Tracking Form and include bsrifallreport):   Caregiver involvement changes: no  Home health supplies by type and quantity ordered/delivered this visit include: no    Clinician asked if patient has had any physician contact since last home care visit and patient states: N/A  Clinician asked if patient has any new or changed medications and patient states:  N/A   If Yes, were medications reconciled? N/A   Was the certifying physician notified of changes in medications? N/A     Clinical assessment (what this visit means for the patient overall and need for ongoing skilled care) and progress or lack of progress towards SPECIFIC goals: Patients wound care is complete. SN needed for furthereval and treatment. Written Teaching Material Utilized: N/A    Interdisciplinary communication with: N/A     Discharge planning as follows:  Will discharge when the patient has reached their maximum functional potential and maximum safety in their home    Specific plan for next visit: Diane Shane in safety issues regarding Tripping hazards

## 2023-07-31 ENCOUNTER — HOME CARE VISIT (OUTPATIENT)
Facility: HOME HEALTH | Age: 73
End: 2023-07-31
Payer: MEDICARE

## 2023-07-31 VITALS
TEMPERATURE: 97.4 F | OXYGEN SATURATION: 100 % | RESPIRATION RATE: 16 BRPM | DIASTOLIC BLOOD PRESSURE: 74 MMHG | HEART RATE: 78 BPM | SYSTOLIC BLOOD PRESSURE: 140 MMHG

## 2023-07-31 PROCEDURE — G0300 HHS/HOSPICE OF LPN EA 15 MIN: HCPCS

## 2023-08-01 NOTE — HOME HEALTH
Subjective: \"I was just about to get a shower. \"  Falls since last visit No(if yes complete the Fall Tracking Form and include bsrifallreport):   Caregiver involvement changes: No  Home health supplies by type and quantity ordered/delivered this visit include: n/a    Clinician asked if patient has had any physician contact since last home care visit and patient states: NO  Clinician asked if patient has any new or changed medications and patient states:  NO   If Yes, were medications reconciled? N/A   Was the certifying physician notified of changes in medications? N/A     Clinical assessment (what this visit means for the patient overall and need for ongoing skilled care) and progress or lack of progress towards SPECIFIC goals: Pt at risk for rehospitalization r/t fall risk, infection, wound exacerbation. Wound healing goals not met. Written Teaching Material Utilized: N/A    Interdisciplinary communication with: N/A for the purpose of n/a    Discharge planning as follows:  When wound is 100% healed    Specific plan for next visit: Assessment, wound care, education as needed

## 2023-08-02 ENCOUNTER — HOME CARE VISIT (OUTPATIENT)
Facility: HOME HEALTH | Age: 73
End: 2023-08-02
Payer: MEDICARE

## 2023-08-02 PROCEDURE — G0300 HHS/HOSPICE OF LPN EA 15 MIN: HCPCS

## 2023-08-03 ENCOUNTER — HOSPITAL ENCOUNTER (OUTPATIENT)
Facility: HOSPITAL | Age: 73
Discharge: HOME | End: 2023-08-03
Payer: MEDICARE

## 2023-08-03 VITALS
HEART RATE: 80 BPM | TEMPERATURE: 98 F | RESPIRATION RATE: 16 BRPM | DIASTOLIC BLOOD PRESSURE: 69 MMHG | SYSTOLIC BLOOD PRESSURE: 173 MMHG

## 2023-08-03 DIAGNOSIS — L97.322 VENOUS STASIS ULCER OF LEFT ANKLE WITH FAT LAYER EXPOSED WITH VARICOSE VEINS (HCC): ICD-10-CM

## 2023-08-03 DIAGNOSIS — I83.023 VENOUS STASIS ULCER OF LEFT ANKLE WITH FAT LAYER EXPOSED WITH VARICOSE VEINS (HCC): ICD-10-CM

## 2023-08-03 DIAGNOSIS — L97.222 NON-PRESSURE CHRONIC ULCER OF LEFT CALF WITH FAT LAYER EXPOSED (HCC): Primary | ICD-10-CM

## 2023-08-03 PROCEDURE — 11042 DBRDMT SUBQ TIS 1ST 20SQCM/<: CPT

## 2023-08-03 RX ORDER — IBUPROFEN 200 MG
TABLET ORAL ONCE
OUTPATIENT
Start: 2023-08-03 | End: 2023-08-03

## 2023-08-03 RX ORDER — BETAMETHASONE DIPROPIONATE 0.05 %
OINTMENT (GRAM) TOPICAL ONCE
OUTPATIENT
Start: 2023-08-03 | End: 2023-08-03

## 2023-08-03 RX ORDER — BACITRACIN ZINC AND POLYMYXIN B SULFATE 500; 1000 [USP'U]/G; [USP'U]/G
OINTMENT TOPICAL ONCE
OUTPATIENT
Start: 2023-08-03 | End: 2023-08-03

## 2023-08-03 RX ORDER — SODIUM CHLOR/HYPOCHLOROUS ACID 0.033 %
SOLUTION, IRRIGATION IRRIGATION ONCE
OUTPATIENT
Start: 2023-08-03 | End: 2023-08-03

## 2023-08-03 RX ORDER — GINSENG 100 MG
CAPSULE ORAL ONCE
OUTPATIENT
Start: 2023-08-03 | End: 2023-08-03

## 2023-08-03 RX ORDER — GENTAMICIN SULFATE 1 MG/G
OINTMENT TOPICAL ONCE
OUTPATIENT
Start: 2023-08-03 | End: 2023-08-03

## 2023-08-03 RX ORDER — CLOBETASOL PROPIONATE 0.5 MG/G
OINTMENT TOPICAL ONCE
OUTPATIENT
Start: 2023-08-03 | End: 2023-08-03

## 2023-08-03 NOTE — FLOWSHEET NOTE
08/03/23 1606   Wound 07/20/23 Pretibial Distal;Left   Date First Assessed/Time First Assessed: 07/20/23 1351   Wound Approximate Age at First Assessment (Weeks): 8 weeks  Primary Wound Type: Venous Ulcer  Location: Pretibial  Wound Location Orientation: Distal;Left   Dressing Status New dressing applied   Dressing/Treatment   (hydrofera blue, gauze, gauze roll and tape.  double tubi F)

## 2023-08-03 NOTE — DISCHARGE INSTRUCTIONS
Discharge Instructions for          [address], [address]  Phone: [phone] Fax: [phone]    NAME:  Breanna Palomo OF BIRTH:  1950  MEDICAL RECORD NUMBER:  [id number]  DATE:  August 3, 2023  WOUND CARE ORDERS:  Left lower leg wound - Cleanse with baby shampoo. Let lather sit 2-3 minutes. Rinse and pat dry. Apply hydrofera blue ready to wound bed. Cover with abd pad. Secure with gauze roll and tape. Place double layer tubigrip, size F, for mild compression. Change every other day or more often if soiled. Activity:  [x] Elevate leg(s) above the level of the heart when sitting. [x] Avoid prolonged standing in one place. [x] Do no get dressing/wrap wet. Dietary:  [] Diet as tolerated      [x] Diabetic Diet            [x] Increase Protein: examples (Meat, cheese, eggs, greek yogurt, fish, nuts)          [] [employer] Powder  Return Appointment:  [x] Return Appointment: With Dr. Ivan Chacko in 1 week. [] Nurse Visit : *** days  [] Ordered tests:    Electronically signed Oni Torres RN on 8/3/2023 at 3:45 PM     [address] [place] [address] Information: Should you experience any significant changes in your wound(s) or have questions about your wound care, please contact the [address] [place] [address] at [facility] 8:00 am - 4:30. If you need help with your wound outside these hours and cannot wait until we are again available, contact your PCP or go to the hospital emergency room. PLEASE NOTE: IF YOU ARE UNABLE TO OBTAIN WOUND SUPPLIES, CONTINUE TO USE THE SUPPLIES YOU HAVE AVAILABLE UNTIL YOU ARE ABLE TO REACH US. IT IS MOST IMPORTANT TO KEEP THE WOUND COVERED AT ALL TIMES.      Physician Signature:_______________________    Date: ___________ Time:  ____________

## 2023-08-03 NOTE — PROGRESS NOTES
[facility]   Medical Staff Progress Note     [address] RECORD NUMBER:  [id number]  AGE: 81 y.o. GENDER: female  : 1950  EPISODE DATE:  8/3/2023    Chief complaint and reason for visit:     Chief Complaint   Patient presents for follow up of her left lower leg non healing venous stasis ulcers. Wound Check      Patient presenting for follow up evaluation of wound(s) per chief complaint. Subjective: Ms Nelson Viveros returns for follow up of her non healing venous stasis ulcers in the left lower leg. She reports no pain or further drainage. She notes the wound is smaller. Edema remains the same. Medical Decision Making:     Problem List Items Addressed This Visit          Circulatory    Venous stasis ulcer of left ankle with fat layer exposed with varicose veins ([address])    Relevant Orders    Initiate Outpatient Wound Care Protocol       Other    Non-pressure chronic ulcer of left calf with fat layer exposed ([address]) - Primary    Relevant Orders    Initiate Outpatient Wound Care Protocol       Wounds and Treatment Plan:  Left lower pretibial wound  - wound is smaller , ( 1.3 x 4.0 x 0.1 cm)   especially from the lateral portion which appears to be cover with new epithelium. Mild serosanguinous drainage is mostly from the medial side of the lesion. No odor. Slough present in the more medial portion of the wound. Small blood blister in the center of the lateral side of the wound. No signs of infection. Treatment - debridement recommended. The patient agrees and consents. With curette, the slough was removed from the wound and the blood blister was decompressed and cleaned. The wound was cleaned and hydrofera blue applied to wound bed, cover with ABD/ gauze roll and double layer Tubigrip for mild compression. FU in 1 week.      Other associated diagnoses or problems addressed:  none    Pertinent imaging

## 2023-08-05 ENCOUNTER — HOME CARE VISIT (OUTPATIENT)
Facility: HOME HEALTH | Age: 73
End: 2023-08-05
Payer: MEDICARE

## 2023-08-06 VITALS
HEART RATE: 79 BPM | TEMPERATURE: 98.7 F | DIASTOLIC BLOOD PRESSURE: 78 MMHG | RESPIRATION RATE: 16 BRPM | OXYGEN SATURATION: 99 % | SYSTOLIC BLOOD PRESSURE: 134 MMHG

## 2023-08-07 ENCOUNTER — HOME CARE VISIT (OUTPATIENT)
Facility: HOME HEALTH | Age: 73
End: 2023-08-07
Payer: MEDICARE

## 2023-08-07 PROCEDURE — G0299 HHS/HOSPICE OF RN EA 15 MIN: HCPCS

## 2023-08-08 VITALS
RESPIRATION RATE: 18 BRPM | TEMPERATURE: 97.9 F | HEART RATE: 89 BPM | DIASTOLIC BLOOD PRESSURE: 80 MMHG | SYSTOLIC BLOOD PRESSURE: 136 MMHG | OXYGEN SATURATION: 99 %

## 2023-08-08 NOTE — HOME HEALTH
Subjective: Pt. reports elevator in building was out of order all last week. Falls since last visit No(if yes complete the Fall Tracking Form and include bsrifallreport):   Caregiver involvement changes: none  Home health supplies by type and quantity ordered/delivered this visit include: 2 200/bag 4x4 gauze, 11 roll gauze, 10 abd pads    Clinician asked if patient has had any physician contact since last home care visit and patient states: NO  Clinician asked if patient has any new or changed medications and patient states:  NO   If Yes, were medications reconciled? N/A   Was the certifying physician notified of changes in medications? N/A     Clinical assessment (what this visit means for the patient overall and need for ongoing skilled care) and progress or lack of progress towards SPECIFIC goals: Patient seen for wound care left lower leg. She reports having planned eye procedure scheduled to be done tomorrow, with a follow-up appointment to check eyes at [facility] Wednesday. Pt. stated she was supposed to have follow-up appointment at wound clinic this Thursday, but it needs to be rescheduled. She stated she wants to see how the eye procedure goes before rescheduling appointment with wound clinic. Pt. was unable to check blood sugar due to needing new sensor. She stated daughter will  new sensor from pharmacy and hopefully bring it to her later today. Visits are required for wound care. Written Teaching Material Utilized: N/A    Interdisciplinary communication with: Nurse with wound clinic about faxing over updated wound care orders from last week's wound clinic appt. Nurse stated ok to use kaci/collagen until pt. receives order of hydrofera blue. Discharge planning as follows:  When wound is 100% healed    Specific plan for next visit: wound care

## 2023-08-09 ENCOUNTER — HOME CARE VISIT (OUTPATIENT)
Facility: HOME HEALTH | Age: 73
End: 2023-08-09
Payer: MEDICARE

## 2023-08-09 VITALS
DIASTOLIC BLOOD PRESSURE: 80 MMHG | HEART RATE: 78 BPM | SYSTOLIC BLOOD PRESSURE: 130 MMHG | OXYGEN SATURATION: 99 % | RESPIRATION RATE: 16 BRPM | TEMPERATURE: 98.7 F

## 2023-08-09 PROCEDURE — G0151 HHCP-SERV OF PT,EA 15 MIN: HCPCS

## 2023-08-09 PROCEDURE — G0300 HHS/HOSPICE OF LPN EA 15 MIN: HCPCS

## 2023-08-09 ASSESSMENT — ENCOUNTER SYMPTOMS
DIARRHEA: 1
BOWEL INCONTINENCE: 1

## 2023-08-10 NOTE — HOME HEALTH
Skilled reason for admission/summary of clinical condition:Patient referred for home health secondary to gradual decline in her activity tolerance and gait. 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 She has also gone to outpatient physical therapy in the past. Her daughter reports that the problem is she does not follow through and do any of her home exercises. Patient moved into her current apartment in April of this year, and since then has had increasing complications with a wound on her left leg and increasing sedentary lifestyle. 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 She underwent cataract surgery yesterday  on the left eye. Today, patient is very agitated, argumentative, depressed with intermittent sadness, as well as moments of being hopeful, and having goals of going back to work. She usually wears a freestyle, elisa blood sugar sensor, but has not had one for the past three days. Patient has been sleeping in her reclining couch and does not take her prosthetic leg off and leaves it on all the time. Diagnosis: nonpressure, chronic ulcer of the left lower leg    Past medical history: chronic kidney disease, diabetes, hypertension, elevated cholesterol, right, BKA, detached retina, Cataracts    Subjective: I want to go back to work. I don't know what to do anymore. They should just put me in a nursing home and let me go. Caregiver: relative. Caregiver assists with: Medications, Meals, Bathing, ADL, IADL, Wound Care and Housekeeping Caregiver unable to assist with: care when she is not there. Caregiver is available Inconsistently  Caregiver is  present at this visit and did participate with clinician. Medications reconciled and all medications are available in the home this visit.  The following education was provided regarding medications: medication interactions and look alike

## 2023-08-11 ENCOUNTER — HOME CARE VISIT (OUTPATIENT)
Facility: HOME HEALTH | Age: 73
End: 2023-08-11
Payer: MEDICARE

## 2023-08-11 VITALS
TEMPERATURE: 97.2 F | RESPIRATION RATE: 18 BRPM | DIASTOLIC BLOOD PRESSURE: 62 MMHG | OXYGEN SATURATION: 97 % | SYSTOLIC BLOOD PRESSURE: 120 MMHG | HEART RATE: 77 BPM

## 2023-08-11 VITALS
SYSTOLIC BLOOD PRESSURE: 140 MMHG | HEART RATE: 77 BPM | RESPIRATION RATE: 16 BRPM | TEMPERATURE: 98.9 F | DIASTOLIC BLOOD PRESSURE: 88 MMHG | OXYGEN SATURATION: 99 %

## 2023-08-11 VITALS
SYSTOLIC BLOOD PRESSURE: 120 MMHG | DIASTOLIC BLOOD PRESSURE: 70 MMHG | OXYGEN SATURATION: 99 % | TEMPERATURE: 98.3 F | HEART RATE: 77 BPM | RESPIRATION RATE: 16 BRPM

## 2023-08-11 PROCEDURE — G0299 HHS/HOSPICE OF RN EA 15 MIN: HCPCS

## 2023-08-11 PROCEDURE — G0151 HHCP-SERV OF PT,EA 15 MIN: HCPCS

## 2023-08-11 NOTE — HOME HEALTH
Subjective: patient states that she did her exercises once yesterday. She does not have any pain today. She was told by [employer] that she has a UTI and she is now on antibiotics. Falls since last visit No(if yes complete the Fall Tracking Form and include bsrifallreport):   Caregiver involvement changes: patient home alone  Home health supplies by type and quantity ordered/delivered this visit include: n/a    Clinician asked if patient has had any physician contact since last home care visit and patient states: YES  Clinician asked if patient has any new or changed medications and patient states:  YES    If Yes, were medications reconciled? YES    Was the certifying physician notified of changes in medications? YES      Clinical assessment (what this visit means for the patient overall and need for ongoing skilled care) and progress or lack of progress towards SPECIFIC goals: patient demonstrates no carryover of education and training on home exercises from prior visit as she required complete reeducation on performing exercises. She also has poor participation with therapist during session today and was stationary and not moving when asked to perform exercises, but then, when functional transfers and scooting were performed, she was able to perform the movement requested without any difficulty. Therapist had a very difficult time getting into patient's building to see her, as she was not answering her phone to open the front door, and the building office was closed. Once therapist was at patient's door she also took a significant amount of time to get to the door and unlock it. Written Teaching Material Utilized: written HEP utilized    Interdisciplinary communication with: Bin Salas RN for the purpose of POC collaboration    Discharge planning as follows:  Will discharge when the patient has reached their maximum functional potential and maximum safety in their home    Specific plan for next visit:

## 2023-08-11 NOTE — HOME HEALTH
Subjective: Patient c/o feeling tired  Falls since last visit No(if yes complete the Fall Tracking Form and include bsrifallreport):   Caregiver involvement changes: none  Home health supplies by type and quantity ordered/delivered this visit include: supplies delivered 8/9 (hydrofera blue, roll gauze, abd pads)    Clinician asked if patient has had any physician contact since last home care visit and patient states: YES  Clinician asked if patient has any new or changed medications and patient states:  YES   If Yes, were medications reconciled? YES   Was the certifying physician notified of changes in medications? YES     Clinical assessment (what this visit means for the patient overall and need for ongoing skilled care) and progress or lack of progress towards SPECIFIC goals: Ms. Palma Brain seen to perform wound care to E. Patient was lying in bed during visit, said she did not want to get up. Patient did not try to assist much to lift leg for old dressing to be removed or new one to be applied. Patient's pharmacy called due to her still not having sensor for freestyle elisa to check sugars. Pharmacy staff reported they would prepare two sensors and it would be prepared today, patient informed of the cost. Wound appears to have gotten slightly larger in width measurements, and new photo was uploaded to [facility]. Patient stated she has not scheduled another appointment yet with wound clinic. Patient reports [facility] saw her the other day, urine was tested and an antibiotic was prescribed. Visits will continue for wound care.    High alert medication teaching on Sulfa (enter name of medication), Antibiotic therapy education Purpose, dose, and frequency, Complete course even if you feel better and Side effects such as nausea, vomiting, diarrhea      Written Teaching Material Utilized: N/A    Interdisciplinary communication with: MD to report wound has gotten bigger, med list update     Discharge planning as

## 2023-08-12 NOTE — HOME HEALTH
Subjective: \" Her urine has been strong, more frequent increase in agitation and confusion. \"  Falls since last visit No(if yes complete the Fall Tracking Form and include bsrifallreport):   Caregiver involvement changes: none  Home health supplies by type and quantity ordered/delivered this visit include: none    Clinician asked if patient has had any physician contact since last home care visit and patient states: YES  Clinician asked if patient has any new or changed medications and patient states:  NO   If Yes, were medications reconciled? NO   Was the certifying physician notified of changes in medications? N/A     Clinical assessment (what this visit means for the patient overall and need for ongoing skilled care) and progress or lack of progress towards SPECIFIC goals: Patient remains at risk/hospitalization due to infection due to wound. Continued need of wound care. Written Teaching Material Utilized: N/A    Interdisciplinary communication with: [facility] for the purpose of assessment for UTI.     Discharge planning as follows: Per physician order and When goals are met    Specific plan for next visit: assessment and education as needed, wound care

## 2023-08-13 ENCOUNTER — HOME CARE VISIT (OUTPATIENT)
Dept: HOME HEALTH SERVICES | Facility: HOME HEALTH | Age: 73
End: 2023-08-13
Payer: MEDICARE

## 2023-08-14 ENCOUNTER — HOME CARE VISIT (OUTPATIENT)
Dept: HOME HEALTH SERVICES | Facility: HOME HEALTH | Age: 73
End: 2023-08-14
Payer: MEDICARE

## 2023-08-15 ENCOUNTER — TELEPHONE (OUTPATIENT)
Age: 73
End: 2023-08-15

## 2023-08-15 NOTE — TELEPHONE ENCOUNTER
Jeremy Watt from [facility] left  on 8/15/2023 at 10:28am to confirm the dosage of Insulin for Ms. Boogie Spicer. Jeremy Watt can be reached at [phone].     Thanks,  Rebekah Mariano

## 2024-02-14 NOTE — PERIOP NOTES
NOTIFIED DR. RODRIGUEZ OFFICE OF [facility] A1C 14.4 , SPOKE WITH HIS NURSE ANGÉLICA, INFORMED RESULTS WERE FAXED TO OFFICE THIS MORNING      [facility] REFERRAL ENTERED  SCHEDULED FOR SURGERY ON 05/10/2019-HYSTEROSCOPY WITH DR. RODRIGUEZ  A1C 14.4  CONTACT INFORMATION [phone]
Preoperative instructions reviewed with patient. Patient given six packs of CHG wipes. Instructions reviewed on use of CHG wipes. Patient given SSI infection FAQS sheet Patient was given the opportunity to ask questions on the information provided.
none

## 2024-04-30 ENCOUNTER — HOSPITAL ENCOUNTER (OUTPATIENT)
Facility: HOSPITAL | Age: 74
Discharge: HOME | End: 2024-04-30
Attending: INTERNAL MEDICINE | Admitting: INTERNAL MEDICINE
Payer: MEDICARE

## 2024-04-30 VITALS
RESPIRATION RATE: 16 BRPM | OXYGEN SATURATION: 100 % | DIASTOLIC BLOOD PRESSURE: 73 MMHG | TEMPERATURE: 97.2 F | HEART RATE: 86 BPM | SYSTOLIC BLOOD PRESSURE: 154 MMHG

## 2024-04-30 PROBLEM — N18.9 ANEMIA ASSOCIATED WITH CHRONIC RENAL FAILURE: Status: ACTIVE | Noted: 2024-04-30

## 2024-04-30 PROBLEM — D63.1 ANEMIA ASSOCIATED WITH CHRONIC RENAL FAILURE: Status: ACTIVE | Noted: 2024-04-30

## 2024-04-30 LAB
GLUCOSE BLD STRIP.AUTO-MCNC: 268 MG/DL (ref 65–100)
HCT VFR BLD AUTO: 26.6 % (ref 35–47)
HGB BLD-MCNC: 7.8 G/DL (ref 11.5–16)
PERFORMED BY:: ABNORMAL

## 2024-04-30 PROCEDURE — 85018 HEMOGLOBIN: CPT

## 2024-04-30 PROCEDURE — 86901 BLOOD TYPING SEROLOGIC RH(D): CPT

## 2024-04-30 PROCEDURE — 86900 BLOOD TYPING SEROLOGIC ABO: CPT

## 2024-04-30 PROCEDURE — 36415 COLL VENOUS BLD VENIPUNCTURE: CPT

## 2024-04-30 PROCEDURE — 36430 TRANSFUSION BLD/BLD COMPNT: CPT

## 2024-04-30 PROCEDURE — 82962 GLUCOSE BLOOD TEST: CPT

## 2024-04-30 PROCEDURE — 86923 COMPATIBILITY TEST ELECTRIC: CPT

## 2024-04-30 PROCEDURE — 36410 VNPNXR 3YR/> PHY/QHP DX/THER: CPT

## 2024-04-30 PROCEDURE — 85014 HEMATOCRIT: CPT

## 2024-04-30 PROCEDURE — P9016 RBC LEUKOCYTES REDUCED: HCPCS

## 2024-04-30 PROCEDURE — 86850 RBC ANTIBODY SCREEN: CPT

## 2024-04-30 RX ORDER — CLOTRIMAZOLE 1 %
CREAM (GRAM) TOPICAL 2 TIMES DAILY
COMMUNITY

## 2024-04-30 RX ORDER — ASCORBIC ACID 500 MG
500 TABLET ORAL DAILY
COMMUNITY

## 2024-04-30 RX ORDER — FERROUS SULFATE 220 (44)/5
7 ELIXIR ORAL DAILY
COMMUNITY

## 2024-04-30 RX ORDER — LACOSAMIDE 50 MG/1
100 TABLET ORAL DAILY
COMMUNITY

## 2024-04-30 RX ORDER — CARVEDILOL 25 MG/1
25 TABLET ORAL 2 TIMES DAILY
COMMUNITY

## 2024-04-30 RX ORDER — ZINC SULFATE 50(220)MG
50 CAPSULE ORAL DAILY
COMMUNITY

## 2024-04-30 RX ORDER — ASPIRIN 81 MG/1
81 TABLET, CHEWABLE ORAL DAILY
COMMUNITY

## 2024-04-30 RX ORDER — LEVETIRACETAM 100 MG/ML
5 SOLUTION ORAL
COMMUNITY

## 2024-04-30 RX ORDER — SODIUM CHLORIDE 9 MG/ML
INJECTION, SOLUTION INTRAVENOUS PRN
Status: DISCONTINUED | OUTPATIENT
Start: 2024-04-30 | End: 2024-05-01 | Stop reason: HOSPADM

## 2024-04-30 RX ORDER — CLONIDINE HYDROCHLORIDE 0.1 MG/1
0.1 TABLET ORAL EVERY 6 HOURS PRN
COMMUNITY

## 2024-04-30 RX ORDER — AMANTADINE HYDROCHLORIDE 100 MG/1
200 TABLET ORAL
COMMUNITY

## 2024-04-30 RX ORDER — DOCUSATE SODIUM 100 MG/1
100 CAPSULE, LIQUID FILLED ORAL 2 TIMES DAILY
COMMUNITY

## 2024-04-30 RX ORDER — PREDNISONE 20 MG/1
20 TABLET ORAL DAILY
COMMUNITY

## 2024-04-30 RX ORDER — ACETAMINOPHEN 325 MG/1
650 TABLET ORAL EVERY 4 HOURS PRN
COMMUNITY

## 2024-04-30 RX ORDER — ONDANSETRON 4 MG/1
4 TABLET, FILM COATED ORAL EVERY 4 HOURS PRN
COMMUNITY

## 2024-04-30 RX ORDER — HEPARIN SODIUM 5000 [USP'U]/ML
5000 INJECTION, SOLUTION INTRAVENOUS; SUBCUTANEOUS EVERY 8 HOURS SCHEDULED
COMMUNITY

## 2024-04-30 RX ORDER — LEVETIRACETAM 100 MG/ML
7.5 SOLUTION ORAL DAILY
COMMUNITY

## 2024-04-30 RX ORDER — SODIUM CHLORIDE 1 G/1
1 TABLET ORAL 4 TIMES DAILY
COMMUNITY

## 2024-04-30 RX ORDER — FUROSEMIDE 10 MG/ML
40 INJECTION INTRAMUSCULAR; INTRAVENOUS SEE ADMIN INSTRUCTIONS
Status: DISCONTINUED | OUTPATIENT
Start: 2024-04-30 | End: 2024-04-30

## 2024-04-30 RX ORDER — INSULIN LISPRO 100 [IU]/ML
INJECTION, SOLUTION INTRAVENOUS; SUBCUTANEOUS EVERY 6 HOURS
COMMUNITY

## 2024-04-30 RX ORDER — ACETYLCYSTEINE 200 MG/ML
70 SOLUTION ORAL; RESPIRATORY (INHALATION) 3 TIMES DAILY
COMMUNITY

## 2024-04-30 NOTE — PROGRESS NOTES
Blood transfusion completed as ordered. Tolerated well. No reaction noted. Oxygen in use via trach mask at 8 liters. Pulse ox 100%. Alert and non verbal. Daughter and grand daughter at the bedside.

## 2024-04-30 NOTE — PROGRESS NOTES
Report called to [facility] - nurse Soha. Discharge instructions given ,vital signs reviewed, instructed that patient to be discharged with medline. Awaiting transport for discharge.

## 2024-04-30 NOTE — PROGRESS NOTES
Called Dr. Figueroa notified him of Hgb 7.8 asked if he still wanted two units of PRBCs and lasix in between both. Dr. Figueroa stated that he only wanted 1 unit PRBCS and no lasix.

## 2024-04-30 NOTE — PROGRESS NOTES
[facility] arrived to transport patient back to [facility]. Suctioned of thick yellow mucus via trach and mouth. Bedside report given to transport team. Verbalized understanding. Daughter at the bedside.

## 2024-04-30 NOTE — PROGRESS NOTES
This nurse asked caregiver if we needed to turn patient she stated, \"I have been turning her\" we changed her brief and laid patient back on her right side.

## 2024-04-30 NOTE — PROGRESS NOTES
Patient appeared and sounded congested and patient was coughing. This nurse suction patient's trach with sterile water with two passes of the suction catheter. The patient seemed better afterwards. Trach mask placed back on patient. O2 saturation is 100% on 8L heated trach mask.

## 2024-04-30 NOTE — PROCEDURES
PICC Line Insertion Procedure Note    Procedure: Insertion of #4 FR/18G Midline    Indications:  Poor Access, Blood transfusion      Procedure Details   Informed consent was obtained for the procedure, including sedation.  Risks of lung perforation, hemorrhage, and adverse drug reaction were discussed.     #4 FR/18G Midline inserted to the R Basilic vein per hospital protocol.   Blood return:  yes    Findings:  Catheter inserted to 12 cm, with 0 cm. Exposed. Mid upper arm circumference is 29 cm.  There were no changes to vital signs. Catheter was flushed with 20 cc NS. Patient did tolerate procedure well.

## 2024-04-30 NOTE — PROGRESS NOTES
Notified Dr. Figueroa that we were unable to draw labs or place a peripheral IV due to poor vascular access. Dr. Figueroa stated to contact [facility] to have a Midline placed for labs and for the patient to receive her 2 units of blood.

## 2024-05-01 LAB
ABO + RH BLD: NORMAL
BLD PROD TYP BPU: NORMAL
BLOOD BANK BLOOD PRODUCT EXPIRATION DATE: NORMAL
BLOOD BANK DISPENSE STATUS: NORMAL
BLOOD BANK ISBT PRODUCT BLOOD TYPE: 5100
BLOOD BANK PRODUCT CODE: NORMAL
BLOOD BANK UNIT TYPE AND RH: NORMAL
BLOOD GROUP ANTIBODIES SERPL: NEGATIVE
BPU ID: NORMAL
CROSSMATCH RESULT: NORMAL
SPECIMEN EXP DATE BLD: NORMAL
TRANSFUSION STATUS PATIENT QL: NORMAL
UNIT DIVISION: 0
UNIT ISSUE DATE/TIME: NORMAL

## 2024-05-01 NOTE — CONSULTS
Nephrology Consultation          Patient: Amarilys Arreaga MRN: [id number]  SSN: [id number]    YOB: 1950  Age: 73 y.o.  Sex: female      Subjective:   Reason for the consultation.  End-stage renal disease and receiving blood transfusion due to severe anemia    History of present illness.  The patient is 73-year-old woman with underlying history of ESRD on maintenance hemodialysis Monday Wednesday Friday, IJ tunneled dialysis catheter, diabetes mellitus type 2, chronic hypoxic respiratory failure, status post tracheostomy, PEG tube placement was sent from the [facility] facility due to severe anemia and blood transfusion as an outpatient.  Her hemoglobin was been 6.2-6.6 at the facility over the last 1 to 2 weeks.  No obvious bleeding.  Repeat hemoglobin at the hospital is 7.8.  She is on large dose of erythropoietin along with IV iron infusion in the dialysis center.  I will plan to give her 1 unit of packed RBCs.  She was dialyzed yesterday at the facility.  No indication for dialysis today.    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    COVID-19 long hauler     Dependence on renal dialysis (HCC)     Monday, Wednesday, Friday    Diabetes (HCC)     dx 1999    End stage renal disease (HCC)     Hemiplegia and hemiparesis following cerebral infarction affecting left non-dominant side (HCC)     Hyperlipidemia     Hypertension     Hypothyroidism     Ill-defined condition     elevated cholesterol    Menopause     Tracheostomy status (HCC)     Type 2 diabetes mellitus 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